# Patient Record
Sex: FEMALE | Race: WHITE | NOT HISPANIC OR LATINO | URBAN - METROPOLITAN AREA
[De-identification: names, ages, dates, MRNs, and addresses within clinical notes are randomized per-mention and may not be internally consistent; named-entity substitution may affect disease eponyms.]

---

## 2017-11-15 ENCOUNTER — OUTPATIENT (OUTPATIENT)
Dept: OUTPATIENT SERVICES | Facility: HOSPITAL | Age: 27
LOS: 1 days | End: 2017-11-15
Payer: MEDICAID

## 2017-11-15 ENCOUNTER — APPOINTMENT (OUTPATIENT)
Dept: OBGYN | Facility: CLINIC | Age: 27
End: 2017-11-15
Payer: MEDICAID

## 2017-11-15 ENCOUNTER — NON-APPOINTMENT (OUTPATIENT)
Age: 27
End: 2017-11-15

## 2017-11-15 VITALS
BODY MASS INDEX: 37.82 KG/M2 | SYSTOLIC BLOOD PRESSURE: 109 MMHG | DIASTOLIC BLOOD PRESSURE: 62 MMHG | HEIGHT: 65 IN | WEIGHT: 227 LBS

## 2017-11-15 DIAGNOSIS — Z34.01 ENCOUNTER FOR SUPERVISION OF NORMAL FIRST PREGNANCY, FIRST TRIMESTER: ICD-10-CM

## 2017-11-15 DIAGNOSIS — Z34.00 ENCOUNTER FOR SUPERVISION OF NORMAL FIRST PREGNANCY, UNSPECIFIED TRIMESTER: ICD-10-CM

## 2017-11-15 DIAGNOSIS — Z3A.11 11 WEEKS GESTATION OF PREGNANCY: ICD-10-CM

## 2017-11-15 DIAGNOSIS — Z87.898 PERSONAL HISTORY OF OTHER SPECIFIED CONDITIONS: ICD-10-CM

## 2017-11-15 DIAGNOSIS — Z11.1 ENCOUNTER FOR SCREENING FOR RESPIRATORY TUBERCULOSIS: ICD-10-CM

## 2017-11-15 DIAGNOSIS — N88.3 INCOMPETENCE OF CERVIX UTERI: ICD-10-CM

## 2017-11-15 DIAGNOSIS — Z82.49 FAMILY HISTORY OF ISCHEMIC HEART DISEASE AND OTHER DISEASES OF THE CIRCULATORY SYSTEM: ICD-10-CM

## 2017-11-15 DIAGNOSIS — Z87.891 PERSONAL HISTORY OF NICOTINE DEPENDENCE: ICD-10-CM

## 2017-11-15 DIAGNOSIS — Z86.19 PERSONAL HISTORY OF OTHER INFECTIOUS AND PARASITIC DISEASES: ICD-10-CM

## 2017-11-15 DIAGNOSIS — E66.9 OBESITY, UNSPECIFIED: ICD-10-CM

## 2017-11-15 LAB
APPEARANCE UR: CLEAR — SIGNIFICANT CHANGE UP
BILIRUB UR-MCNC: NEGATIVE — SIGNIFICANT CHANGE UP
COLOR SPEC: YELLOW — SIGNIFICANT CHANGE UP
DIFF PNL FLD: NEGATIVE — SIGNIFICANT CHANGE UP
GLUCOSE UR QL: NEGATIVE MG/DL — SIGNIFICANT CHANGE UP
KETONES UR-MCNC: NEGATIVE — SIGNIFICANT CHANGE UP
LEUKOCYTE ESTERASE UR-ACNC: NEGATIVE — SIGNIFICANT CHANGE UP
NITRITE UR-MCNC: NEGATIVE — SIGNIFICANT CHANGE UP
PH UR: 6.5 — SIGNIFICANT CHANGE UP (ref 5–8)
PROT UR-MCNC: NEGATIVE MG/DL — SIGNIFICANT CHANGE UP
SP GR SPEC: 1.01 — SIGNIFICANT CHANGE UP (ref 1.01–1.02)
UROBILINOGEN FLD QL: NEGATIVE MG/DL — SIGNIFICANT CHANGE UP

## 2017-11-15 PROCEDURE — 81003 URINALYSIS AUTO W/O SCOPE: CPT

## 2017-11-15 PROCEDURE — 86762 RUBELLA ANTIBODY: CPT

## 2017-11-15 PROCEDURE — 81099 UNLISTED URINALYSIS PX: CPT | Mod: NC

## 2017-11-15 PROCEDURE — 81025 URINE PREGNANCY TEST: CPT

## 2017-11-15 PROCEDURE — 86900 BLOOD TYPING SEROLOGIC ABO: CPT

## 2017-11-15 PROCEDURE — 83020 HEMOGLOBIN ELECTROPHORESIS: CPT

## 2017-11-15 PROCEDURE — 86850 RBC ANTIBODY SCREEN: CPT

## 2017-11-15 PROCEDURE — 86901 BLOOD TYPING SEROLOGIC RH(D): CPT

## 2017-11-15 PROCEDURE — 81025 URINE PREGNANCY TEST: CPT | Mod: NC

## 2017-11-15 PROCEDURE — 99203 OFFICE O/P NEW LOW 30 MIN: CPT | Mod: NC

## 2017-11-15 PROCEDURE — 87389 HIV-1 AG W/HIV-1&-2 AB AG IA: CPT

## 2017-11-15 PROCEDURE — 86787 VARICELLA-ZOSTER ANTIBODY: CPT

## 2017-11-15 PROCEDURE — 36415 COLL VENOUS BLD VENIPUNCTURE: CPT | Mod: NC

## 2017-11-15 PROCEDURE — G0463: CPT

## 2017-11-15 PROCEDURE — 86780 TREPONEMA PALLIDUM: CPT

## 2017-11-15 PROCEDURE — 83020 HEMOGLOBIN ELECTROPHORESIS: CPT | Mod: 26

## 2017-11-15 PROCEDURE — 83655 ASSAY OF LEAD: CPT

## 2017-11-15 PROCEDURE — 36415 COLL VENOUS BLD VENIPUNCTURE: CPT

## 2017-11-15 PROCEDURE — 81220 CFTR GENE COM VARIANTS: CPT

## 2017-11-15 PROCEDURE — 87086 URINE CULTURE/COLONY COUNT: CPT

## 2017-11-15 PROCEDURE — 86480 TB TEST CELL IMMUN MEASURE: CPT

## 2017-11-15 RX ORDER — PRENATAL VIT NO.130/IRON/FOLIC 27MG-0.8MG
TABLET ORAL
Refills: 0 | Status: ACTIVE | COMMUNITY

## 2017-11-16 LAB
CULTURE RESULTS: SIGNIFICANT CHANGE UP
HIV 1+2 AB+HIV1 P24 AG SERPL QL IA: SIGNIFICANT CHANGE UP
LEAD BLD-MCNC: SIGNIFICANT CHANGE UP UG/DL (ref 0–4)
RUBV IGG SER-ACNC: 18 INDEX — SIGNIFICANT CHANGE UP
RUBV IGG SER-IMP: POSITIVE — SIGNIFICANT CHANGE UP
SPECIMEN SOURCE: SIGNIFICANT CHANGE UP
T PALLIDUM AB TITR SER: NEGATIVE — SIGNIFICANT CHANGE UP
VZV IGG FLD QL IA: 836.2 INDEX — SIGNIFICANT CHANGE UP
VZV IGG FLD QL IA: POSITIVE — SIGNIFICANT CHANGE UP

## 2017-11-17 LAB
HEMOGLOBIN INTERPRETATION: SIGNIFICANT CHANGE UP
HGB A MFR BLD: 97.3 % — SIGNIFICANT CHANGE UP (ref 95.8–98)
HGB A2 MFR BLD: 2.7 % — SIGNIFICANT CHANGE UP (ref 2–3.2)
M TB TUBERC IFN-G BLD QL: 0 IU/ML — SIGNIFICANT CHANGE UP
M TB TUBERC IFN-G BLD QL: 0.02 IU/ML — SIGNIFICANT CHANGE UP
M TB TUBERC IFN-G BLD QL: NEGATIVE — SIGNIFICANT CHANGE UP
MITOGEN IGNF BCKGRD COR BLD-ACNC: 4.31 IU/ML — SIGNIFICANT CHANGE UP

## 2017-11-22 DIAGNOSIS — Z34.01 ENCOUNTER FOR SUPERVISION OF NORMAL FIRST PREGNANCY, FIRST TRIMESTER: ICD-10-CM

## 2017-11-22 DIAGNOSIS — E66.9 OBESITY, UNSPECIFIED: ICD-10-CM

## 2017-11-22 DIAGNOSIS — Z3A.11 11 WEEKS GESTATION OF PREGNANCY: ICD-10-CM

## 2017-11-22 LAB — CYSTIC FIBROSIS EXPANDED PANEL: SIGNIFICANT CHANGE UP

## 2017-11-30 ENCOUNTER — APPOINTMENT (OUTPATIENT)
Dept: ANTEPARTUM | Facility: CLINIC | Age: 27
End: 2017-11-30
Payer: MEDICAID

## 2017-11-30 ENCOUNTER — ASOB RESULT (OUTPATIENT)
Age: 27
End: 2017-11-30

## 2017-11-30 PROCEDURE — 76801 OB US < 14 WKS SINGLE FETUS: CPT

## 2017-11-30 PROCEDURE — 99201 OFFICE OUTPATIENT NEW 10 MINUTES: CPT | Mod: 25

## 2017-11-30 PROCEDURE — 36416 COLLJ CAPILLARY BLOOD SPEC: CPT

## 2017-11-30 PROCEDURE — 76813 OB US NUCHAL MEAS 1 GEST: CPT

## 2017-12-13 ENCOUNTER — APPOINTMENT (OUTPATIENT)
Dept: OBGYN | Facility: CLINIC | Age: 27
End: 2017-12-13

## 2018-01-25 ENCOUNTER — APPOINTMENT (OUTPATIENT)
Dept: ANTEPARTUM | Facility: CLINIC | Age: 28
End: 2018-01-25

## 2020-04-29 ENCOUNTER — APPOINTMENT (OUTPATIENT)
Dept: PEDIATRIC CARDIOLOGY | Facility: CLINIC | Age: 30
End: 2020-04-29
Payer: MEDICAID

## 2020-04-29 PROCEDURE — 76821 MIDDLE CEREBRAL ARTERY ECHO: CPT

## 2020-04-29 PROCEDURE — 99201 OFFICE OUTPATIENT NEW 10 MINUTES: CPT | Mod: 25

## 2020-04-29 PROCEDURE — 76820 UMBILICAL ARTERY ECHO: CPT

## 2020-04-29 PROCEDURE — 93325 DOPPLER ECHO COLOR FLOW MAPG: CPT | Mod: 59

## 2020-04-29 PROCEDURE — 76825 ECHO EXAM OF FETAL HEART: CPT

## 2020-04-29 PROCEDURE — 76827 ECHO EXAM OF FETAL HEART: CPT

## 2020-07-06 ENCOUNTER — ASOB RESULT (OUTPATIENT)
Age: 30
End: 2020-07-06

## 2020-07-06 ENCOUNTER — APPOINTMENT (OUTPATIENT)
Dept: MATERNAL FETAL MEDICINE | Facility: CLINIC | Age: 30
End: 2020-07-06
Payer: MEDICAID

## 2020-07-06 PROCEDURE — G0108 DIAB MANAGE TRN  PER INDIV: CPT | Mod: 95

## 2020-07-23 ENCOUNTER — TRANSCRIPTION ENCOUNTER (OUTPATIENT)
Age: 30
End: 2020-07-23

## 2020-07-27 ENCOUNTER — ASOB RESULT (OUTPATIENT)
Age: 30
End: 2020-07-27

## 2020-07-27 ENCOUNTER — APPOINTMENT (OUTPATIENT)
Dept: MATERNAL FETAL MEDICINE | Facility: CLINIC | Age: 30
End: 2020-07-27
Payer: MEDICAID

## 2020-07-27 PROCEDURE — G0108 DIAB MANAGE TRN  PER INDIV: CPT | Mod: 95

## 2020-08-11 DIAGNOSIS — Z01.818 ENCOUNTER FOR OTHER PREPROCEDURAL EXAMINATION: ICD-10-CM

## 2020-08-13 ENCOUNTER — APPOINTMENT (OUTPATIENT)
Dept: DISASTER EMERGENCY | Facility: CLINIC | Age: 30
End: 2020-08-13

## 2020-08-13 LAB — SARS-COV-2 N GENE NPH QL NAA+PROBE: NOT DETECTED

## 2020-08-16 ENCOUNTER — INPATIENT (INPATIENT)
Facility: HOSPITAL | Age: 30
LOS: 1 days | Discharge: HOME | End: 2020-08-18
Attending: OBSTETRICS & GYNECOLOGY | Admitting: OBSTETRICS & GYNECOLOGY

## 2020-08-16 ENCOUNTER — TRANSCRIPTION ENCOUNTER (OUTPATIENT)
Age: 30
End: 2020-08-16

## 2020-08-16 VITALS — SYSTOLIC BLOOD PRESSURE: 131 MMHG | HEART RATE: 88 BPM | DIASTOLIC BLOOD PRESSURE: 60 MMHG

## 2020-08-16 DIAGNOSIS — O24.415 GESTATIONAL DIABETES MELLITUS IN PREGNANCY, CONTROLLED BY ORAL HYPOGLYCEMIC DRUGS: ICD-10-CM

## 2020-08-16 DIAGNOSIS — O24.419 GESTATIONAL DIABETES MELLITUS IN PREGNANCY, UNSPECIFIED CONTROL: ICD-10-CM

## 2020-08-16 LAB
BASOPHILS # BLD AUTO: 0.03 K/UL — SIGNIFICANT CHANGE UP (ref 0–0.2)
BASOPHILS NFR BLD AUTO: 0.2 % — SIGNIFICANT CHANGE UP (ref 0–2)
BLD GP AB SCN SERPL QL: NEGATIVE — SIGNIFICANT CHANGE UP
EOSINOPHIL # BLD AUTO: 0.04 K/UL — SIGNIFICANT CHANGE UP (ref 0–0.5)
EOSINOPHIL NFR BLD AUTO: 0.3 % — SIGNIFICANT CHANGE UP (ref 0–6)
GLUCOSE BLDC GLUCOMTR-MCNC: 86 MG/DL — SIGNIFICANT CHANGE UP (ref 70–99)
GLUCOSE BLDC GLUCOMTR-MCNC: 95 MG/DL — SIGNIFICANT CHANGE UP (ref 70–99)
GLUCOSE BLDC GLUCOMTR-MCNC: 96 MG/DL — SIGNIFICANT CHANGE UP (ref 70–99)
HCT VFR BLD CALC: 36 % — SIGNIFICANT CHANGE UP (ref 34.5–45)
HGB BLD-MCNC: 12 G/DL — SIGNIFICANT CHANGE UP (ref 11.5–15.5)
IMM GRANULOCYTES NFR BLD AUTO: 0.5 % — SIGNIFICANT CHANGE UP (ref 0–1.5)
LYMPHOCYTES # BLD AUTO: 1.83 K/UL — SIGNIFICANT CHANGE UP (ref 1–3.3)
LYMPHOCYTES # BLD AUTO: 14.5 % — SIGNIFICANT CHANGE UP (ref 13–44)
MCHC RBC-ENTMCNC: 30.2 PG — SIGNIFICANT CHANGE UP (ref 27–34)
MCHC RBC-ENTMCNC: 33.3 % — SIGNIFICANT CHANGE UP (ref 32–36)
MCV RBC AUTO: 90.7 FL — SIGNIFICANT CHANGE UP (ref 80–100)
MONOCYTES # BLD AUTO: 0.58 K/UL — SIGNIFICANT CHANGE UP (ref 0–0.9)
MONOCYTES NFR BLD AUTO: 4.6 % — SIGNIFICANT CHANGE UP (ref 2–14)
NEUTROPHILS # BLD AUTO: 10.05 K/UL — HIGH (ref 1.8–7.4)
NEUTROPHILS NFR BLD AUTO: 79.9 % — HIGH (ref 43–77)
NRBC # FLD: 0 K/UL — SIGNIFICANT CHANGE UP (ref 0–0)
PLATELET # BLD AUTO: 279 K/UL — SIGNIFICANT CHANGE UP (ref 150–400)
PMV BLD: 9.9 FL — SIGNIFICANT CHANGE UP (ref 7–13)
RBC # BLD: 3.97 M/UL — SIGNIFICANT CHANGE UP (ref 3.8–5.2)
RBC # FLD: 13.4 % — SIGNIFICANT CHANGE UP (ref 10.3–14.5)
RH IG SCN BLD-IMP: POSITIVE — SIGNIFICANT CHANGE UP
RH IG SCN BLD-IMP: POSITIVE — SIGNIFICANT CHANGE UP
WBC # BLD: 12.59 K/UL — HIGH (ref 3.8–10.5)
WBC # FLD AUTO: 12.59 K/UL — HIGH (ref 3.8–10.5)

## 2020-08-16 RX ORDER — ACETAMINOPHEN 500 MG
975 TABLET ORAL ONCE
Refills: 0 | Status: COMPLETED | OUTPATIENT
Start: 2020-08-16 | End: 2020-08-16

## 2020-08-16 RX ORDER — SODIUM CHLORIDE 9 MG/ML
1000 INJECTION INTRAMUSCULAR; INTRAVENOUS; SUBCUTANEOUS
Refills: 0 | Status: DISCONTINUED | OUTPATIENT
Start: 2020-08-16 | End: 2020-08-17

## 2020-08-16 RX ORDER — SODIUM CHLORIDE 9 MG/ML
1000 INJECTION, SOLUTION INTRAVENOUS
Refills: 0 | Status: DISCONTINUED | OUTPATIENT
Start: 2020-08-16 | End: 2020-08-17

## 2020-08-16 RX ORDER — OXYTOCIN 10 UNIT/ML
333.33 VIAL (ML) INJECTION
Qty: 20 | Refills: 0 | Status: COMPLETED | OUTPATIENT
Start: 2020-08-16 | End: 2020-08-16

## 2020-08-16 RX ORDER — OXYTOCIN 10 UNIT/ML
2 VIAL (ML) INJECTION
Qty: 30 | Refills: 0 | Status: DISCONTINUED | OUTPATIENT
Start: 2020-08-16 | End: 2020-08-17

## 2020-08-16 RX ADMIN — Medication 975 MILLIGRAM(S): at 17:38

## 2020-08-16 RX ADMIN — Medication 975 MILLIGRAM(S): at 16:30

## 2020-08-16 NOTE — OB PROVIDER H&P - PROBLEM SELECTOR PLAN 1
-Admit to L&D  -Routine labs  -Alt IVF  -BGM q2h active labor  -BGM q4h latent labor  -Anesthesia consult  -Vaginal Cytotec  Cervical balloon when possible  -D/W Dr. Dow

## 2020-08-16 NOTE — CHART NOTE - NSCHARTNOTEFT_GEN_A_CORE
R1 Labor Note    S: Patient evaluated at bedside for cervical change.     O:  T(C): 36.9 (20 @ 19:00), Max: 36.9 (20 @ 19:00)  HR: 65 (20 @ 20:44) (65 - 88)  BP: 132/62 (20 @ 20:44) (131/60 - 132/62)  RR: 18 (20 @ 15:20) (18 - 18)  SpO2: --    SVE: 3.5/60/-3  EFM: Category 1  [place]: q 4 minutes         A/P 29y P1 @ 40 wks IOL  -IOL:Vag cyto placed, possible pitocin at next cervical check  -Cat 1 tracing  -Anticipate     d/w Dr. Jose Cardoso PGY-1

## 2020-08-16 NOTE — OB PROVIDER H&P - HISTORY OF PRESENT ILLNESS
28 y/o  EDC 20 @40w presents for scheduled IOL for A2. Denies abd pain, LOF, VB. Endorses +FM. GBS negative. COVID negative.     AP course complicated by:   -GDMA2 on metformin 500 qd, fastings 70s-75s, postprandials 110-120s  OBhx:   -2018 /6#15/female/uncomplicated  GYNhx: Denies fibroids, cysts, abnormal pap smears, STDs  PMHx:   -Obesity prepregnancy 230lbs current 249.5 BMI 40.3  PSHx: Denies  Social: Denies x3  Psych: Denies    NKDA  Meds: Metformin 500mg qd, PNV

## 2020-08-16 NOTE — CHART NOTE - NSCHARTNOTEFT_GEN_A_CORE
NP note    Pt seen for placement of vaginal cytotec #1      T(C): 36.8 (16 Aug 2020 15:20), Max: 36.8 (16 Aug 2020 15:20)  T(F): 98.2 (16 Aug 2020 15:20), Max: 98.2 (16 Aug 2020 15:20)  HR: 88 (16 Aug 2020 15:20) (88 - 88)  BP: 131/60 (16 Aug 2020 15:20) (131/60 - 131/60)  BGM 95  /moderate variability/+ Accels/no decels  [place] irregualr  SVE 0.5/50/-3    Vaginal cytotec#1 placed without incident. Pt tolerated well.     -Re-evaluate in 4 hours  -Cervical balloon when possible    sredze, NP

## 2020-08-16 NOTE — OB PROVIDER H&P - NS_OBGYNHISTORY_OBGYN_ALL_OB_FT
AP course complicated by:   -GDMA2 on metformin 500 qd, fastings 70s-75s, postprandials 110-120s  OBhx:   -2018 /6#15/female/uncomplicated  GYNhx: Denies fibroids, cysts, abnormal pap smears, STDs

## 2020-08-16 NOTE — OB PROVIDER H&P - ASSESSMENT
GEN: NAD  Lungs: CTA-B  Heart: R/R/R  Abd soft, NT, gravid  HR: 88 (16 Aug 2020 15:03) (88 - 88)  BP: 131/60 (16 Aug 2020 15:03) (131/60 - 131/60)  Awaiting BGM  /moderate variability/+ Accels/no decels  [place] irregular  Sono vtx confirmed, EFW~8#3 as of   SVE 0.5/50/-3    A/P: 28 y/o  EDC 20 @40w IOL for A2    -Admit to L&D  -Routine labs  -Alt IVF  -BGM q2h active labor  -BGM q4h latent labor  -Anesthesia consult  -Vaginal Cytotec  Cervical balloon when possible  -D/W Dr. Paloma biswas, NP

## 2020-08-17 LAB — T PALLIDUM AB TITR SER: NEGATIVE — SIGNIFICANT CHANGE UP

## 2020-08-17 RX ORDER — LANOLIN
1 OINTMENT (GRAM) TOPICAL EVERY 6 HOURS
Refills: 0 | Status: DISCONTINUED | OUTPATIENT
Start: 2020-08-17 | End: 2020-08-18

## 2020-08-17 RX ORDER — IBUPROFEN 200 MG
600 TABLET ORAL EVERY 6 HOURS
Refills: 0 | Status: DISCONTINUED | OUTPATIENT
Start: 2020-08-17 | End: 2020-08-18

## 2020-08-17 RX ORDER — OXYTOCIN 10 UNIT/ML
2 VIAL (ML) INJECTION
Qty: 30 | Refills: 0 | Status: DISCONTINUED | OUTPATIENT
Start: 2020-08-17 | End: 2020-08-17

## 2020-08-17 RX ORDER — SODIUM CHLORIDE 9 MG/ML
1000 INJECTION INTRAMUSCULAR; INTRAVENOUS; SUBCUTANEOUS
Refills: 0 | Status: DISCONTINUED | OUTPATIENT
Start: 2020-08-17 | End: 2020-08-17

## 2020-08-17 RX ORDER — OXYTOCIN 10 UNIT/ML
333.33 VIAL (ML) INJECTION
Qty: 20 | Refills: 0 | Status: DISCONTINUED | OUTPATIENT
Start: 2020-08-17 | End: 2020-08-17

## 2020-08-17 RX ORDER — ONDANSETRON 8 MG/1
4 TABLET, FILM COATED ORAL ONCE
Refills: 0 | Status: COMPLETED | OUTPATIENT
Start: 2020-08-17 | End: 2020-08-17

## 2020-08-17 RX ORDER — AER TRAVELER 0.5 G/1
1 SOLUTION RECTAL; TOPICAL EVERY 4 HOURS
Refills: 0 | Status: DISCONTINUED | OUTPATIENT
Start: 2020-08-17 | End: 2020-08-18

## 2020-08-17 RX ORDER — PRAMOXINE HYDROCHLORIDE 150 MG/15G
1 AEROSOL, FOAM RECTAL EVERY 4 HOURS
Refills: 0 | Status: DISCONTINUED | OUTPATIENT
Start: 2020-08-17 | End: 2020-08-18

## 2020-08-17 RX ORDER — DIPHENHYDRAMINE HCL 50 MG
25 CAPSULE ORAL EVERY 6 HOURS
Refills: 0 | Status: DISCONTINUED | OUTPATIENT
Start: 2020-08-17 | End: 2020-08-18

## 2020-08-17 RX ORDER — OXYCODONE HYDROCHLORIDE 5 MG/1
5 TABLET ORAL ONCE
Refills: 0 | Status: DISCONTINUED | OUTPATIENT
Start: 2020-08-17 | End: 2020-08-18

## 2020-08-17 RX ORDER — HYDROCORTISONE 1 %
1 OINTMENT (GRAM) TOPICAL EVERY 6 HOURS
Refills: 0 | Status: DISCONTINUED | OUTPATIENT
Start: 2020-08-17 | End: 2020-08-18

## 2020-08-17 RX ORDER — ACETAMINOPHEN 500 MG
3 TABLET ORAL
Qty: 0 | Refills: 0 | DISCHARGE
Start: 2020-08-17

## 2020-08-17 RX ORDER — SIMETHICONE 80 MG/1
80 TABLET, CHEWABLE ORAL EVERY 4 HOURS
Refills: 0 | Status: DISCONTINUED | OUTPATIENT
Start: 2020-08-17 | End: 2020-08-18

## 2020-08-17 RX ORDER — IBUPROFEN 200 MG
600 TABLET ORAL EVERY 6 HOURS
Refills: 0 | Status: COMPLETED | OUTPATIENT
Start: 2020-08-17 | End: 2021-07-16

## 2020-08-17 RX ORDER — TETANUS TOXOID, REDUCED DIPHTHERIA TOXOID AND ACELLULAR PERTUSSIS VACCINE, ADSORBED 5; 2.5; 8; 8; 2.5 [IU]/.5ML; [IU]/.5ML; UG/.5ML; UG/.5ML; UG/.5ML
0.5 SUSPENSION INTRAMUSCULAR ONCE
Refills: 0 | Status: DISCONTINUED | OUTPATIENT
Start: 2020-08-17 | End: 2020-08-18

## 2020-08-17 RX ORDER — MAGNESIUM HYDROXIDE 400 MG/1
30 TABLET, CHEWABLE ORAL
Refills: 0 | Status: DISCONTINUED | OUTPATIENT
Start: 2020-08-17 | End: 2020-08-18

## 2020-08-17 RX ORDER — DIBUCAINE 1 %
1 OINTMENT (GRAM) RECTAL EVERY 6 HOURS
Refills: 0 | Status: DISCONTINUED | OUTPATIENT
Start: 2020-08-17 | End: 2020-08-18

## 2020-08-17 RX ORDER — OXYCODONE HYDROCHLORIDE 5 MG/1
5 TABLET ORAL
Refills: 0 | Status: DISCONTINUED | OUTPATIENT
Start: 2020-08-17 | End: 2020-08-18

## 2020-08-17 RX ORDER — ACETAMINOPHEN 500 MG
975 TABLET ORAL
Refills: 0 | Status: DISCONTINUED | OUTPATIENT
Start: 2020-08-17 | End: 2020-08-18

## 2020-08-17 RX ORDER — KETOROLAC TROMETHAMINE 30 MG/ML
30 SYRINGE (ML) INJECTION ONCE
Refills: 0 | Status: DISCONTINUED | OUTPATIENT
Start: 2020-08-17 | End: 2020-08-17

## 2020-08-17 RX ORDER — SODIUM CHLORIDE 9 MG/ML
500 INJECTION INTRAMUSCULAR; INTRAVENOUS; SUBCUTANEOUS ONCE
Refills: 0 | Status: DISCONTINUED | OUTPATIENT
Start: 2020-08-17 | End: 2020-08-17

## 2020-08-17 RX ORDER — BENZOCAINE 10 %
1 GEL (GRAM) MUCOUS MEMBRANE EVERY 6 HOURS
Refills: 0 | Status: DISCONTINUED | OUTPATIENT
Start: 2020-08-17 | End: 2020-08-18

## 2020-08-17 RX ORDER — SODIUM CHLORIDE 9 MG/ML
3 INJECTION INTRAMUSCULAR; INTRAVENOUS; SUBCUTANEOUS EVERY 8 HOURS
Refills: 0 | Status: DISCONTINUED | OUTPATIENT
Start: 2020-08-17 | End: 2020-08-18

## 2020-08-17 RX ADMIN — Medication 975 MILLIGRAM(S): at 16:50

## 2020-08-17 RX ADMIN — Medication 975 MILLIGRAM(S): at 09:04

## 2020-08-17 RX ADMIN — Medication 1000 MILLIUNIT(S)/MIN: at 02:54

## 2020-08-17 RX ADMIN — Medication 600 MILLIGRAM(S): at 13:00

## 2020-08-17 RX ADMIN — Medication 25 MILLIGRAM(S): at 03:28

## 2020-08-17 RX ADMIN — ONDANSETRON 4 MILLIGRAM(S): 8 TABLET, FILM COATED ORAL at 00:20

## 2020-08-17 RX ADMIN — SODIUM CHLORIDE 3 MILLILITER(S): 9 INJECTION INTRAMUSCULAR; INTRAVENOUS; SUBCUTANEOUS at 15:45

## 2020-08-17 RX ADMIN — Medication 975 MILLIGRAM(S): at 20:38

## 2020-08-17 RX ADMIN — Medication 30 MILLIGRAM(S): at 03:04

## 2020-08-17 RX ADMIN — Medication 975 MILLIGRAM(S): at 15:52

## 2020-08-17 RX ADMIN — Medication 600 MILLIGRAM(S): at 19:29

## 2020-08-17 RX ADMIN — Medication 600 MILLIGRAM(S): at 11:57

## 2020-08-17 RX ADMIN — SODIUM CHLORIDE 3 MILLILITER(S): 9 INJECTION INTRAMUSCULAR; INTRAVENOUS; SUBCUTANEOUS at 20:40

## 2020-08-17 RX ADMIN — Medication 975 MILLIGRAM(S): at 21:15

## 2020-08-17 RX ADMIN — Medication 600 MILLIGRAM(S): at 23:51

## 2020-08-17 RX ADMIN — OXYCODONE HYDROCHLORIDE 5 MILLIGRAM(S): 5 TABLET ORAL at 21:15

## 2020-08-17 RX ADMIN — Medication 30 MILLIGRAM(S): at 03:23

## 2020-08-17 RX ADMIN — OXYCODONE HYDROCHLORIDE 5 MILLIGRAM(S): 5 TABLET ORAL at 20:38

## 2020-08-17 RX ADMIN — Medication 975 MILLIGRAM(S): at 08:13

## 2020-08-17 RX ADMIN — Medication 600 MILLIGRAM(S): at 18:48

## 2020-08-17 NOTE — PROGRESS NOTE ADULT - SUBJECTIVE AND OBJECTIVE BOX
Post-partum Note,   She is a  29y woman who is now post-partum day: 0    Subjective:  The patient feels well.  She is ambulating.   She is tolerating regular diet.  She denies nausea and vomiting; denies fever.  She is voiding.  Her pain is controlled.  She reports normal postpartum bleeding.  She is breastfeeding and formula feeding.    Physical exam:    Vital Signs Last 24 Hrs  T(C): 36.7 (17 Aug 2020 10:07), Max: 36.9 (16 Aug 2020 19:00)  T(F): 98 (17 Aug 2020 10:07), Max: 98.42 (16 Aug 2020 23:59)  HR: 61 (17 Aug 2020 10:07) (50 - 156)  BP: 110/65 (17 Aug 2020 10:07) (80/43 - 132/62)  BP(mean): --  RR: 18 (17 Aug 2020 10:07) (18 - 18)  SpO2: 98% (17 Aug 2020 10:07) (72% - 100%)    Gen: NAD  Breast: Soft, nontender, not engorged.  Abdomen: Soft, nontender, no distension , firm uterine fundus at umbilicus.  Pelvic: Normal lochia noted  Ext: No calf tenderness    LABS:                        12.0   12.59 )-----------( 279      ( 16 Aug 2020 15:00 )             36.0     ABO Interpretation: O (08-16 @ 15:08)  Rh Interpretation: Positive (08-16 @ 15:08)  Antibody Screen: Negative ( @ 15:00)  ABO Interpretation: O (0816 @ 15:00)  Rh Interpretation: Positive (16 @ 15:00)    Allergies    No Known Allergies      MEDICATIONS  (STANDING):  acetaminophen   Tablet .. 975 milliGRAM(s) Oral <User Schedule>  diphtheria/tetanus/pertussis (acellular) Vaccine (ADAcel) 0.5 milliLiter(s) IntraMuscular once  ibuprofen  Tablet. 600 milliGRAM(s) Oral every 6 hours  prenatal multivitamin 1 Tablet(s) Oral daily  sodium chloride 0.9% lock flush 3 milliLiter(s) IV Push every 8 hours    MEDICATIONS  (PRN):  benzocaine 20%/menthol 0.5% Spray 1 Spray(s) Topical every 6 hours PRN for Perineal discomfort  dibucaine 1% Ointment 1 Application(s) Topical every 6 hours PRN Perineal discomfort  diphenhydrAMINE 25 milliGRAM(s) Oral every 6 hours PRN Pruritus  hydrocortisone 1% Cream 1 Application(s) Topical every 6 hours PRN Moderate Pain (4-6)  lanolin Ointment 1 Application(s) Topical every 6 hours PRN nipple soreness  magnesium hydroxide Suspension 30 milliLiter(s) Oral two times a day PRN Constipation  oxyCODONE    IR 5 milliGRAM(s) Oral every 3 hours PRN Moderate to Severe Pain (4-10)  oxyCODONE    IR 5 milliGRAM(s) Oral once PRN Moderate to Severe Pain (4-10)  pramoxine 1%/zinc 5% Cream 1 Application(s) Topical every 4 hours PRN Moderate Pain (4-6)  simethicone 80 milliGRAM(s) Chew every 4 hours PRN Gas  witch hazel Pads 1 Application(s) Topical every 4 hours PRN Perineal discomfort

## 2020-08-17 NOTE — CHART NOTE - NSCHARTNOTEFT_GEN_A_CORE
ISE placed shortly after SVE at 12:26 am on 8/17/2020 as the tracing was discontinuous on multiple occasions.       Stephanie Cardoso, PGY-1

## 2020-08-17 NOTE — OB RN DELIVERY SUMMARY - NS_LABORCHARACTER_OBGYN_ALL_OB
Internal electronic FM/Induction of labor-Medicinal/Meconium staining/Induction of labor-AROM/External electronic FM

## 2020-08-17 NOTE — DISCHARGE NOTE OB - CARE PROVIDER_API CALL
Leah Garcia  OBS-GYN - GENERAL  [id number] [place] [address]  Phone: [phone]  Fax: [phone]  Follow Up Time:

## 2020-08-17 NOTE — DISCHARGE NOTE OB - PATIENT PORTAL LINK FT
You can access the FollowMyHealth Patient Portal offered by [facility] by registering at the following website: [email or web address]. By joining [employer]’s FollowMyHealth portal, you will also be able to view your health information using other applications (apps) compatible with our system.

## 2020-08-17 NOTE — OB RN DELIVERY SUMMARY - NS_SEPSISRSKCALC_OBGYN_ALL_OB_FT
EOS calculated successfully. EOS Risk Factor: 0.5/1000 live births ([facility] national incidence); GA=40w1d; Temp=98.42; ROM=2.133; GBS='Negative'; Antibiotics='No antibiotics or any antibiotics < 2 hrs prior to birth'

## 2020-08-17 NOTE — CHART NOTE - NSCHARTNOTEFT_GEN_A_CORE
R1 Labor Note    S: Patient evaluated at bedside for cervical change.     O:  T(C): 36.8 (20 @ 23:15), Max: 36.9 (20 @ 19:00)  HR: 75 (20 @ 00:15) (51 - 88)  BP: 80/43 (20 @ 00:15) (80/43 - 132/62)  RR: 18 (20 @ 15:20) (18 - 18)  SpO2: 100% (20 @ 00:12) (83% - 100%)    SVE: 4.5/70/-2  EFM: Category 1   [place]:  q 4 minutes      A/P 29y P1 @ 40wks IOL  -IOL: Start on Pitocin   -AROM at midnight  -Cat 1 tracing  -Analgesia: Epidural in place   -Zofran for nausea  -Anticipate     d/w Dr. Jose Cardoso PGY-1

## 2020-08-17 NOTE — OB PROVIDER DELIVERY SUMMARY - NSPROVIDERDELIVERYNOTE_OBGYN_ALL_OB_FT
Spontaneous vaginal delivery of liveborn infant from OA position. Head, shoulders, and body delivered easily. Infant was suctioned. Light mec. Delayed cord clamping and infant was passed to mother. Cord clamped and cut. Placenta delivered intact with a 3 vessel cord. Fundal massage was given and uterine fundus was found to be firm. Vaginal exam revealed an intact cervix, vaginal walls and sulci. Patient had no lacerations in the perineum. Excellent hemostasis was noted. Patient was stable and went to recovery. Count was correct x 2.     Stephanie Cardoso, PGY-1

## 2020-08-17 NOTE — PROGRESS NOTE ADULT - ASSESSMENT
Assessment and Plan  PPD #0 s/p   Doing well and bonding with baby  Encourage ambulation.  PP & PPD Instructions reviewed.  CPC.  D/C home tomorrow.

## 2020-08-17 NOTE — DISCHARGE NOTE OB - MEDICATION SUMMARY - MEDICATIONS TO TAKE
I will START or STAY ON the medications listed below when I get home from the hospital:  None I will START or STAY ON the medications listed below when I get home from the hospital:    acetaminophen 325 mg oral tablet  -- 3 tab(s) by mouth every 6 hours, As Needed  -- Indication: For pain

## 2020-08-18 VITALS
TEMPERATURE: 98 F | OXYGEN SATURATION: 96 % | HEART RATE: 58 BPM | DIASTOLIC BLOOD PRESSURE: 71 MMHG | RESPIRATION RATE: 17 BRPM | SYSTOLIC BLOOD PRESSURE: 123 MMHG

## 2020-08-18 RX ADMIN — Medication 600 MILLIGRAM(S): at 06:30

## 2020-08-18 RX ADMIN — Medication 975 MILLIGRAM(S): at 04:15

## 2020-08-18 RX ADMIN — Medication 600 MILLIGRAM(S): at 00:44

## 2020-08-18 RX ADMIN — SODIUM CHLORIDE 3 MILLILITER(S): 9 INJECTION INTRAMUSCULAR; INTRAVENOUS; SUBCUTANEOUS at 06:30

## 2020-08-18 RX ADMIN — Medication 975 MILLIGRAM(S): at 03:47

## 2020-08-18 NOTE — PROGRESS NOTE ADULT - SUBJECTIVE AND OBJECTIVE BOX
Patient reports feeling well. She reports minimal pain and minimal vaginal bleeding.    Physical exam:    Vital Signs Last 24 Hrs  T(C): 36.8 (18 Aug 2020 06:08), Max: 36.8 (17 Aug 2020 18:20)  T(F): 98.2 (18 Aug 2020 06:08), Max: 98.2 (17 Aug 2020 18:20)  HR: 58 (18 Aug 2020 06:08) (58 - 75)  BP: 123/71 (18 Aug 2020 06:08) (110/65 - 123/71)  BP(mean): --  RR: 17 (18 Aug 2020 06:08) (17 - 18)  SpO2: 96% (18 Aug 2020 06:08) (96% - 99%)    Gen: NAD  Breast: Soft, nontender, not engorged.  Abdomen: Soft, nontender, no distension, firm uterine fundus at umbilicus.  Pelvic: Normal lochia noted  Ext: No calf tenderness    LABS:                        12.0   12.59 )-----------( 279      ( 16 Aug 2020 15:00 )             36.0       [place] status:     Allergies    No Known Allergies    Intolerances      MEDICATIONS  (STANDING):  acetaminophen   Tablet .. 975 milliGRAM(s) Oral <User Schedule>  diphtheria/tetanus/pertussis (acellular) Vaccine (ADAcel) 0.5 milliLiter(s) IntraMuscular once  ibuprofen  Tablet. 600 milliGRAM(s) Oral every 6 hours  prenatal multivitamin 1 Tablet(s) Oral daily  sodium chloride 0.9% lock flush 3 milliLiter(s) IV Push every 8 hours    MEDICATIONS  (PRN):  benzocaine 20%/menthol 0.5% Spray 1 Spray(s) Topical every 6 hours PRN for Perineal discomfort  dibucaine 1% Ointment 1 Application(s) Topical every 6 hours PRN Perineal discomfort  diphenhydrAMINE 25 milliGRAM(s) Oral every 6 hours PRN Pruritus  hydrocortisone 1% Cream 1 Application(s) Topical every 6 hours PRN Moderate Pain (4-6)  lanolin Ointment 1 Application(s) Topical every 6 hours PRN nipple soreness  magnesium hydroxide Suspension 30 milliLiter(s) Oral two times a day PRN Constipation  oxyCODONE    IR 5 milliGRAM(s) Oral every 3 hours PRN Moderate to Severe Pain (4-10)  oxyCODONE    IR 5 milliGRAM(s) Oral once PRN Moderate to Severe Pain (4-10)  pramoxine 1%/zinc 5% Cream 1 Application(s) Topical every 4 hours PRN Moderate Pain (4-6)  simethicone 80 milliGRAM(s) Chew every 4 hours PRN Gas  witch hazel Pads 1 Application(s) Topical every 4 hours PRN Perineal discomfort        Assessment and Plan  PPD #1 s/p   Doing well.  Encourage ambulation.  PP & PPD Instructions reviewed.  Discharge home today  To follow up in the office in 6 weeks for postpartum visit

## 2020-08-19 RX ORDER — ACETAMINOPHEN 325 MG/1
325 TABLET ORAL
Refills: 0 | Status: ACTIVE | COMMUNITY
Start: 2020-08-19

## 2020-08-19 RX ORDER — PROGESTERONE 200 MG/1
CAPSULE ORAL
Refills: 0 | Status: DISCONTINUED | COMMUNITY
End: 2020-08-19

## 2020-08-21 ENCOUNTER — NON-APPOINTMENT (OUTPATIENT)
Age: 30
End: 2020-08-21

## 2023-04-21 ENCOUNTER — INPATIENT (INPATIENT)
Facility: HOSPITAL | Age: 33
LOS: 0 days | Discharge: HOME | DRG: 69 | End: 2023-04-22
Attending: PSYCHIATRY & NEUROLOGY | Admitting: PSYCHIATRY & NEUROLOGY
Payer: MEDICAID

## 2023-04-21 VITALS
DIASTOLIC BLOOD PRESSURE: 68 MMHG | SYSTOLIC BLOOD PRESSURE: 124 MMHG | TEMPERATURE: 98 F | RESPIRATION RATE: 18 BRPM | HEART RATE: 108 BPM | OXYGEN SATURATION: 97 %

## 2023-04-21 DIAGNOSIS — G45.9 TRANSIENT CEREBRAL ISCHEMIC ATTACK, UNSPECIFIED: ICD-10-CM

## 2023-04-21 LAB
ALBUMIN SERPL ELPH-MCNC: 4.4 G/DL — SIGNIFICANT CHANGE UP (ref 3.3–5)
ALP SERPL-CCNC: 64 U/L — SIGNIFICANT CHANGE UP (ref 40–120)
ALT FLD-CCNC: 12 U/L — SIGNIFICANT CHANGE UP (ref 10–45)
AMPHET UR-MCNC: NEGATIVE — SIGNIFICANT CHANGE UP
ANION GAP SERPL CALC-SCNC: 12 MMOL/L — SIGNIFICANT CHANGE UP (ref 5–17)
APTT BLD: 36.6 SEC — HIGH (ref 27.5–35.5)
AST SERPL-CCNC: 14 U/L — SIGNIFICANT CHANGE UP (ref 10–40)
BARBITURATES UR SCN-MCNC: NEGATIVE — SIGNIFICANT CHANGE UP
BASE EXCESS BLDV CALC-SCNC: -0.4 MMOL/L — SIGNIFICANT CHANGE UP (ref -2–3)
BASOPHILS # BLD AUTO: 0.04 K/UL — SIGNIFICANT CHANGE UP (ref 0–0.2)
BASOPHILS NFR BLD AUTO: 0.4 % — SIGNIFICANT CHANGE UP (ref 0–2)
BENZODIAZ UR-MCNC: NEGATIVE — SIGNIFICANT CHANGE UP
BILIRUB SERPL-MCNC: 0.2 MG/DL — SIGNIFICANT CHANGE UP (ref 0.2–1.2)
BUN SERPL-MCNC: 19 MG/DL — SIGNIFICANT CHANGE UP (ref 7–23)
CA-I SERPL-SCNC: 1.18 MMOL/L — SIGNIFICANT CHANGE UP (ref 1.15–1.33)
CALCIUM SERPL-MCNC: 9.4 MG/DL — SIGNIFICANT CHANGE UP (ref 8.4–10.5)
CHLORIDE BLDV-SCNC: 106 MMOL/L — SIGNIFICANT CHANGE UP (ref 96–108)
CHLORIDE SERPL-SCNC: 102 MMOL/L — SIGNIFICANT CHANGE UP (ref 96–108)
CO2 BLDV-SCNC: 26 MMOL/L — SIGNIFICANT CHANGE UP (ref 22–26)
CO2 SERPL-SCNC: 25 MMOL/L — SIGNIFICANT CHANGE UP (ref 22–31)
COCAINE METAB.OTHER UR-MCNC: NEGATIVE — SIGNIFICANT CHANGE UP
CREAT SERPL-MCNC: 0.91 MG/DL — SIGNIFICANT CHANGE UP (ref 0.5–1.3)
CRP SERPL-MCNC: <3 MG/L — SIGNIFICANT CHANGE UP (ref 0–4)
EGFR: 85 ML/MIN/1.73M2 — SIGNIFICANT CHANGE UP
EOSINOPHIL # BLD AUTO: 0.06 K/UL — SIGNIFICANT CHANGE UP (ref 0–0.5)
EOSINOPHIL NFR BLD AUTO: 0.6 % — SIGNIFICANT CHANGE UP (ref 0–6)
GAS PNL BLDV: 138 MMOL/L — SIGNIFICANT CHANGE UP (ref 136–145)
GAS PNL BLDV: SIGNIFICANT CHANGE UP
GLUCOSE BLDV-MCNC: 92 MG/DL — SIGNIFICANT CHANGE UP (ref 70–99)
GLUCOSE SERPL-MCNC: 104 MG/DL — HIGH (ref 70–99)
HCO3 BLDV-SCNC: 25 MMOL/L — SIGNIFICANT CHANGE UP (ref 22–29)
HCT VFR BLD CALC: 43.8 % — SIGNIFICANT CHANGE UP (ref 34.5–45)
HCT VFR BLDA CALC: 39 % — SIGNIFICANT CHANGE UP (ref 34.5–46.5)
HGB BLD CALC-MCNC: 13 G/DL — SIGNIFICANT CHANGE UP (ref 11.7–16.1)
HGB BLD-MCNC: 14.6 G/DL — SIGNIFICANT CHANGE UP (ref 11.5–15.5)
IMM GRANULOCYTES NFR BLD AUTO: 0.3 % — SIGNIFICANT CHANGE UP (ref 0–0.9)
INR BLD: 1.05 RATIO — SIGNIFICANT CHANGE UP (ref 0.88–1.16)
LACTATE BLDV-MCNC: 0.8 MMOL/L — SIGNIFICANT CHANGE UP (ref 0.5–2)
LYMPHOCYTES # BLD AUTO: 2.43 K/UL — SIGNIFICANT CHANGE UP (ref 1–3.3)
LYMPHOCYTES # BLD AUTO: 23.7 % — SIGNIFICANT CHANGE UP (ref 13–44)
MCHC RBC-ENTMCNC: 31 PG — SIGNIFICANT CHANGE UP (ref 27–34)
MCHC RBC-ENTMCNC: 33.3 GM/DL — SIGNIFICANT CHANGE UP (ref 32–36)
MCV RBC AUTO: 93 FL — SIGNIFICANT CHANGE UP (ref 80–100)
METHADONE UR-MCNC: NEGATIVE — SIGNIFICANT CHANGE UP
MONOCYTES # BLD AUTO: 0.6 K/UL — SIGNIFICANT CHANGE UP (ref 0–0.9)
MONOCYTES NFR BLD AUTO: 5.8 % — SIGNIFICANT CHANGE UP (ref 2–14)
NEUTROPHILS # BLD AUTO: 7.1 K/UL — SIGNIFICANT CHANGE UP (ref 1.8–7.4)
NEUTROPHILS NFR BLD AUTO: 69.2 % — SIGNIFICANT CHANGE UP (ref 43–77)
NRBC # BLD: 0 /100 WBCS — SIGNIFICANT CHANGE UP (ref 0–0)
OPIATES UR-MCNC: NEGATIVE — SIGNIFICANT CHANGE UP
OXYCODONE UR-MCNC: NEGATIVE — SIGNIFICANT CHANGE UP
PCO2 BLDV: 42 MMHG — SIGNIFICANT CHANGE UP (ref 39–42)
PCP SPEC-MCNC: SIGNIFICANT CHANGE UP
PCP UR-MCNC: NEGATIVE — SIGNIFICANT CHANGE UP
PH BLDV: 7.38 — SIGNIFICANT CHANGE UP (ref 7.32–7.43)
PLATELET # BLD AUTO: 312 K/UL — SIGNIFICANT CHANGE UP (ref 150–400)
PO2 BLDV: 67 MMHG — HIGH (ref 25–45)
POTASSIUM BLDV-SCNC: 3.7 MMOL/L — SIGNIFICANT CHANGE UP (ref 3.5–5.1)
POTASSIUM SERPL-MCNC: 4 MMOL/L — SIGNIFICANT CHANGE UP (ref 3.5–5.3)
POTASSIUM SERPL-SCNC: 4 MMOL/L — SIGNIFICANT CHANGE UP (ref 3.5–5.3)
PROT SERPL-MCNC: 7.4 G/DL — SIGNIFICANT CHANGE UP (ref 6–8.3)
PROTHROM AB SERPL-ACNC: 12.2 SEC — SIGNIFICANT CHANGE UP (ref 10.5–13.4)
RBC # BLD: 4.71 M/UL — SIGNIFICANT CHANGE UP (ref 3.8–5.2)
RBC # FLD: 13.1 % — SIGNIFICANT CHANGE UP (ref 10.3–14.5)
SAO2 % BLDV: 95.6 % — HIGH (ref 67–88)
SODIUM SERPL-SCNC: 139 MMOL/L — SIGNIFICANT CHANGE UP (ref 135–145)
THC UR QL: POSITIVE
TROPONIN T, HIGH SENSITIVITY RESULT: <6 NG/L — SIGNIFICANT CHANGE UP (ref 0–51)
WBC # BLD: 10.26 K/UL — SIGNIFICANT CHANGE UP (ref 3.8–10.5)
WBC # FLD AUTO: 10.26 K/UL — SIGNIFICANT CHANGE UP (ref 3.8–10.5)

## 2023-04-21 PROCEDURE — 99223 1ST HOSP IP/OBS HIGH 75: CPT

## 2023-04-21 PROCEDURE — 99285 EMERGENCY DEPT VISIT HI MDM: CPT

## 2023-04-21 PROCEDURE — 70498 CT ANGIOGRAPHY NECK: CPT | Mod: 26,MA

## 2023-04-21 PROCEDURE — 70496 CT ANGIOGRAPHY HEAD: CPT | Mod: 26,MA

## 2023-04-21 RX ORDER — ATORVASTATIN CALCIUM 80 MG/1
80 TABLET, FILM COATED ORAL AT BEDTIME
Refills: 0 | Status: DISCONTINUED | OUTPATIENT
Start: 2023-04-21 | End: 2023-04-22

## 2023-04-21 RX ORDER — SODIUM CHLORIDE 9 MG/ML
1000 INJECTION INTRAMUSCULAR; INTRAVENOUS; SUBCUTANEOUS ONCE
Refills: 0 | Status: COMPLETED | OUTPATIENT
Start: 2023-04-21 | End: 2023-04-21

## 2023-04-21 RX ORDER — CLOPIDOGREL BISULFATE 75 MG/1
75 TABLET, FILM COATED ORAL DAILY
Refills: 0 | Status: DISCONTINUED | OUTPATIENT
Start: 2023-04-21 | End: 2023-04-22

## 2023-04-21 RX ORDER — ASPIRIN/CALCIUM CARB/MAGNESIUM 324 MG
81 TABLET ORAL DAILY
Refills: 0 | Status: DISCONTINUED | OUTPATIENT
Start: 2023-04-21 | End: 2023-04-22

## 2023-04-21 RX ORDER — ENOXAPARIN SODIUM 100 MG/ML
40 INJECTION SUBCUTANEOUS EVERY 24 HOURS
Refills: 0 | Status: DISCONTINUED | OUTPATIENT
Start: 2023-04-21 | End: 2023-04-22

## 2023-04-21 RX ADMIN — ENOXAPARIN SODIUM 40 MILLIGRAM(S): 100 INJECTION SUBCUTANEOUS at 22:37

## 2023-04-21 RX ADMIN — SODIUM CHLORIDE 1000 MILLILITER(S): 9 INJECTION INTRAMUSCULAR; INTRAVENOUS; SUBCUTANEOUS at 15:16

## 2023-04-21 RX ADMIN — Medication 81 MILLIGRAM(S): at 19:48

## 2023-04-21 RX ADMIN — ATORVASTATIN CALCIUM 80 MILLIGRAM(S): 80 TABLET, FILM COATED ORAL at 22:38

## 2023-04-21 NOTE — ED PROVIDER NOTE - CLINICAL SUMMARY MEDICAL DECISION MAKING FREE TEXT BOX
Dr. Rodriguez: 32F h/o TIAs in the past presented as a code stroke. LKW 8:15, 6 hrs PTA, as per mother had a few min episode of right facial droop and slurred speech, which has since resolved. Pt denies all other complaints. +smokes tobacco.  On exam pt is well appearing, nad, NIHSS 0, RRR, CTAB, abdo soft/nt/nd, no ataxia.  Code stroke called and pt seen with neuro. CTs show old strokes. Will f/u with neuro re: recs for further work up.   Pt did not meet criteria for tenecteplase given symptoms resolved and non disabling.

## 2023-04-21 NOTE — ED PROVIDER NOTE - OBJECTIVE STATEMENT
32-year-old female prior history of multiple TIAs presenting to emergency department as stroke code after having episode of slurred speech, right-sided facial droop today at 8:15 AM.  Symptoms lasted for a couple minutes according to family and then self resolved.  Patient currently asymptomatic.  Denies fever, headache, vision change, chest pain, shortness of breath, abdominal pain, dysuria.  Patient has no known cancer history or known hematologic abnormality.  Patient does smoke and is currently on birth control.

## 2023-04-21 NOTE — CONSULT NOTE ADULT - ASSESSMENT
HPI: Patient BIA PUENTE is a 32y (1990) wo/man with a PMHx significant for multiple TIAs has presented to the ED due to transient R lower facial droop around 8 AM 4/21/23 (LKW). It lasted for 1.5 minutes and then resolved, associated with some light headedness. Pt's mother has a hx of leukemia and pt herself is a smoker and is on birth control. Pt is not a teneceteplase candidate given that symptoms resolved. Not on AC/AP.     NIH 0  preMRS 0   ABCD 2    No acute findings on CT per Radiology.     Impression: Transient R lower facial droop possibly 2/2 TIA vs. stroke vs. other etiology    Recommenedations:   Imaging:   -F/U CT/CTA  -MRI brain w/o contrast, if no contraindications  -TTE  -Can consider STAT CT head non-contrast for change in neuro exam    Secondary prevention of stroke:  -Permissive HTN up to 220/110 for 24 hours from symptom onset, gradual normotension over 2-3 days  -Continue 81 mg ASA daily   -Atorvastatin 80 mg daily (long-term goal LDL < 70)  -Tight glucose control (long-term goal HgbA1c < 6%)    Misc/additional recs:   -Neuro checks Q4  -Dysphagia screen  -Speech and swallow eval if dysphagia screen fails  -NPO except meds until dysphagia screen passed  -Head of bed > 30 degrees for aspiration prevention and aspiration precautions ordered.  -Fall precautions, aspiration precautions  -Continuous  telemetry to monitor for arrhythmia  -Stroke labwork: HgbA1C, fasting lipid panel, CBC, CMP, coag pannel, troponin  -Baseline EKG  -PT/OT  -DVT Prophylaxis: Lovenox 40 mg Sq daily unless contraindicated in which case SCDs     Case discussed with Dr. Roxanna Howard    under supervision of stroke attending Dr. Gorman

## 2023-04-21 NOTE — H&P ADULT - HISTORY OF PRESENT ILLNESS
HPI: Patient BIA PUENTE is a 32y (1990) woman, pt of Dr. Bueno and sent in by him for work up, with a PMHx significant for multiple "mini strokes", current tobacco since 18 yo (current 6/day, prior pack/per day) +vapes and  daily marijuana smoker, Zippfizz energy drink user, on OCP, not on AC/AP, has presented to the ED as a code stroke due to transient R lower facial droop around 8 AM 4/21/23 (LKW).. It lasted for 1.5 minutes and then resolved, associated with some light headedness. Pt states around that time she stood up and "felt a rush". Denies any changes in smell or taste, no feeling of doom. Pt's mother has a hx of leukemia and pt herself is on birth control and pt has lost about 50 lbs since 05/2023.   Pt states her prior stroke like event was about 2 years ago when she felt like her "face got stuck" and had some trouble getting words out, first event was about 9 years ago - all told to be mini strokes. Pt denies any recent HAs and no FH of strokes. Pt is not a teneceteplase candidate given that symptoms resolved.     NIH 0  preMRS 0

## 2023-04-21 NOTE — ED PROVIDER NOTE - PROGRESS NOTE DETAILS
Leonides Ritchie MD, PGY1  Discussed with neurology who will admit to their service. Discussed with patient and answered all questions.

## 2023-04-21 NOTE — ED PROVIDER NOTE - PHYSICAL EXAMINATION
Gen: WDWN, NAD  HEENT: EOMI, no nasal discharge, mucous membranes moist  CV: RRR, +S1/S2, no M/R/G, 2+ radial pulses b/l  Resp: CTAB, no W/R/R, no accessory muscle use, no increased work of breathing  GI: Abdomen soft non-distended, NTTP  MSK: No open wounds, no bruising, no LE edema. Strength 5/5 bl UE and LE  Neuro: CN II-XII grossly intact. finger to nose intact. negative romberg. no pronator drift. No facial asymmetry. A&Ox4, following commands, moving all four extremities spontaneously  Psych: appropriate mood

## 2023-04-21 NOTE — H&P ADULT - ASSESSMENT
HPI: Patient BIA PUENTE is a 32y (1990) woman, pt of Dr. Bueno and sent in by him for work up, with a PMHx significant for multiple "mini strokes", current tobacco since 16 yo (current 6/day, prior pack/per day) +vapes and  dailymarijuana smoker, Zippfizz energy drink user, on OCP, not on AC/AP, has presented to the ED as a code stroke due to transient R lower facial droop around 8 AM 23 (LKW).. It lasted for 1.5 minutes and then resolved, associated with some light headedness. Pt states around that time she stood up and "felt a rush". Denies any changes in smell or taste, no feeling of doom. Pt's mother has a hx of leukemia and pt herself is on birth control and pt has lost about 50 lbs since 2023.   Pt states her prior stroke like event was about 2 years ago when she felt like her "face got stuck" and had some trouble getting words out, first event was about 9 years ago - all told to be mini strokes. Pt denies any recent HAs and no FH of strokes. Pt is not a teneceteplase candidate given that symptoms resolved.     NIH 0  preMRS 0   ABCD 2    Imaging as above.    Impression: 31 yo woman current tobacco/marijuana user with previous old strokes and current TIA like presentation with above imaging findings of unclear etiology at this time. Could be possibly 2/2  as a complication of current habits, but may need to other processes such as infectious, inflammatory, toxic/metabolic etc.     Recommendations:  Imaging:   -MRI brain w and w/o contrast, if no contraindications  -TTE  -Can consider STAT CT head non-contrast for change in neuro exam    Secondary prevention of stroke:  -Permissive HTN up to 220/110 for 24 hours from symptom onset, gradual normotension over 2-3 days  -81 mg ASA daily and 75 mg plavix for NIHSS < 3 and ABCD score > 4, otherwise only 81 mg ASA daily  -Atorvastatin 80 mg daily (long-term goal LDL < 70)  -Tight glucose control (long-term goal HgbA1c < 6%)    Misc/additional recs:   ESR, CRP  RPR w/ reflex  Drug screen  -Neuro checks Q4  -Dysphagia screen  -Speech and swallow eval if dysphagia screen fails  -NPO except meds until dysphagia screen passed  -Head of bed > 30 degrees for aspiration prevention and aspiration precautions ordered.  -Fall precautions, aspiration precautions  -Continuous  telemetry to monitor for arrhythmia  -Stroke labwork: HgbA1C, fasting lipid panel, CBC, CMP, coag pannel, troponin  -Baseline EKG  -PT/OT  -DVT Prophylaxis: Lovenox 40 mg Sq daily unless contraindicated in which case SCDs     Case discussed with stroke fellow under supervision of stroke attending Dr. Gorman.                       HPI: Patient BIA PUENTE is a 32y (1990) woman, pt of Dr. Bueno and sent in by him for work up, with a PMHx significant for multiple "mini strokes", current tobacco since 16 yo (current 6/day, prior pack/per day) +vapes and  dailymarijuana smoker, Zippfizz energy drink user, on OCP, not on AC/AP, has presented to the ED as a code stroke due to transient R lower facial droop around 8 AM 23 (LKW).. It lasted for 1.5 minutes and then resolved, associated with some light headedness. Pt states around that time she stood up and "felt a rush". Denies any changes in smell or taste, no feeling of doom. Pt's mother has a hx of leukemia and pt herself is on birth control and pt has lost about 50 lbs since 2023.   Pt states her prior stroke like event was about 2 years ago when she felt like her "face got stuck" and had some trouble getting words out, first event was about 9 years ago - all told to be mini strokes. Pt denies any recent HAs and no FH of strokes. Pt is not a teneceteplase candidate given that symptoms resolved.     NIH 0  preMRS 0   ABCD 2    Imaging as above.    Impression: 33 yo woman current daily tobacco/vape/marijuana user with previous old strokes and current TIA like presentation with above imaging findings of unclear etiology at this time. Could be possibly 2/2  as a complication of current habits, but may need to other processes such as infectious, inflammatory, toxic/metabolic etc. CTA also discussed with radiology-not concerning for vasculitis unless hx suggests otherwise.    Recommendations:  Imaging:   -MRI brain w and w/o contrast, if no contraindications  -TTE  -Can consider STAT CT head non-contrast for change in neuro exam    Secondary prevention of stroke:  -Permissive HTN up to 220/110 for 24 hours from symptom onset, gradual normotension over 2-3 days  -81 mg ASA daily and 75 mg plavix for NIHSS < 3 and ABCD score > 4, otherwise only 81 mg ASA daily  -Atorvastatin 80 mg daily (long-term goal LDL < 70)  -Tight glucose control (long-term goal HgbA1c < 6%)    Misc/additional recs:   ESR, CRP  RPR w/ reflex  Drug screen  -Neuro checks Q4  -Dysphagia screen  -Speech and swallow eval if dysphagia screen fails  -NPO except meds until dysphagia screen passed  -Head of bed > 30 degrees for aspiration prevention and aspiration precautions ordered.  -Fall precautions, aspiration precautions  -Continuous  telemetry to monitor for arrhythmia  -Stroke labwork: HgbA1C, fasting lipid panel, CBC, CMP, coag pannel, troponin  -Baseline EKG  -PT/OT  -DVT Prophylaxis: Lovenox 40 mg Sq daily unless contraindicated in which case SCDs     Case discussed with stroke fellow under supervision of stroke attending Dr. Gorman.

## 2023-04-21 NOTE — ED ADULT NURSE NOTE - OBJECTIVE STATEMENT
33 yo female with a PMH of multiple TIAs presents to the ED complaining of a facial droop. Reports she went to bed yesterday at 1245, woke up this morning at 630. Reports she was playing with her kids and when she stood up felt extremely dizzy. Mom was with patient and reports that when patient was smiling this morning at 0800 noticed a facial droop [unknown which side as mother does not remember]. Patient reports she had an episode of aphasia and could not get words out to her mother. Patient self reporting that symptoms lasted about 2 minutes. Upon arrival, code stroke initiated at 1411, patient taken to CT scan. Neuro intact, GELLER 5/5 strength. PERRL. Denies headache, vision changes, chest pain, shortness of breath, abdominal pain, nausea, vomiting, diarrhea, fevers, chills, dysuria, hematuria, recent illness travel or fall.

## 2023-04-21 NOTE — ED ADULT NURSE NOTE - NSIMPLEMENTINTERV_GEN_ALL_ED
Implemented All Fall with Harm Risk Interventions:  [place] to call system. Call bell, personal items and telephone within reach. Instruct patient to call for assistance. Room bathroom lighting operational. Non-slip footwear when patient is off stretcher. Physically safe environment: no spills, clutter or unnecessary equipment. Stretcher in lowest position, wheels locked, appropriate side rails in place. Provide visual cue, wrist band, yellow gown, etc. Monitor gait and stability. Monitor for mental status changes and reorient to person, place, and time. Review medications for side effects contributing to fall risk. Reinforce activity limits and safety measures with patient and family. Provide visual clues: red socks.

## 2023-04-21 NOTE — H&P ADULT - NSHPLABSRESULTS_GEN_ALL_CORE
ACC: [id number] EXAM: CT ANGIO BRAIN STROKE PROTC IC ORDERED BY: ERNESTINE MADDEN    ACC: [id number] EXAM: CT ANGIO NECK STROKE PROTCL IC ORDERED BY: ERNESTINE MADDEN    ACC: [id number] EXAM: CT BRAIN STROKE PROTOCOL ORDERED BY: ERNESTINE MADDEN    PROCEDURE DATE: 04/21/2023        INTERPRETATION: Clinical Indication: Resolved facial droop, mini strokes    5mm axial sections of the brain were obtained from base to vertex, without the intravenous administration of contrast material. Coronal and sagittal computer generated reconstructed views are available.    No prior brain imaging is available for comparison.    Circular and sulcal prominence for the patient's age is identified consistent with volume loss which is nonspecific but may be related to chronic illness or certain medications. There is an old left lentiform nucleus at infarct. There is an old right caudate head infarct. There is no hemorrhage.        After the intravenous power injection of 70 cc of Omnipaque 300 using a bolus alexys timing run, serial thin sections were obtained through the neck from the thoracic inlet through the intracranial circulation centered at the [id number] on a multislice CT scanner reformatted with coronal and sagittal 2 D-MIP projections, including 3 D reconstructions using a separate 3D Vit[employer] software workstation.A total of 70 cc of Omnipaque were intravenously injected. 30 cc were discarded.      The origins of the carotid and vertebral arteries are normal. The carotid bifurcations are normal bilaterally. The left vertebral artery is dominant.      The distal vertebral arteries are well identified as are the posterior-inferior cerebellar arteries bilaterally. The region of the vertebral basilar junction is normal. The basilar artery is normal. The posterior cerebral and superior cerebellar arteries are normal.    Distal cervical, petrous, cavernous and supraclinoid internal carotid arteries. The anterior cerebral arteries, anterior communicating artery and middle cerebral arteries are normal.    There is no evidence of aneurysm, stenosis, or vessel occlusion.      The normal intracranial venous circulation is identified. The right transverse sinus is dominant. The superior sagittal sinus, internal cerebral veins, vein of Santo, straight sinus, transverse sinuses, sigmoid sinuses and internal jugular veins are normal. Cortical veins are normal.    View of the regional soft tissues of the neck are within normal limits for the patient's age.        IMPRESSION: Volume loss for the patient's age suggesting an underlying chronic illness or may be due to medications. 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 No hemorrhage. Normal CTA of the head and neck.

## 2023-04-21 NOTE — H&P ADULT - NSHPPHYSICALEXAM_GEN_ALL_CORE
General - NAD  Cardiovascular - Peripheral pulses palpable, no edema  Eyes -  Fundoscopy not performed due to safety precautions in the setting of the COVID-19 pandemic    Neurologic Exam:  Mental status - Awake, Alert, Oriented to person, place, and time. Speech fluent, repetition and naming intact. Follows simple and complex commands. Attention/concentration, recent and remote memory (including registration and recall), and fund of knowledge intact    Cranial nerves - PERRLA, VFF, EOMI, face sensation (V1-V3) intact b/l, facial strength intact without asymmetry b/l, hearing intact b/l, palate with symmetric elevation, sternocleidomastiod 5/5 strength b/l, tongue midline on protrusion with full lateral movement    Motor - Normal bulk and tone throughout. No pronator drift.  Strength testing  No drift noted in extremities.     Sensation - Light touch/temperature  intact throughout    Coordination - Finger to Nose intact b/l. No tremors appreciated    Gait and station - Normal casual gait. Romberg (-)

## 2023-04-21 NOTE — CONSULT NOTE ADULT - SUBJECTIVE AND OBJECTIVE BOX
Neurology - Consult Note    -  Spectra: [phone] ([facility]), [phone] ([facility])  -    HPI: Patient BIA PUENTE is a 32y (1990) wo/man with a PMHx significant for multiple TIAs has presented to the ED due to transient R lower facial droop around 8 AM 4/21/23 ([facility]). It lasted for 1.5 minutes and then resolved, associated with some light headedness. Pt's mother has a hx of leukemia and pt herself is a smoker and is on birth control. Pt is not a teneceteplase candidate given that symptoms resolved. Not on AC/AP.     NIH 0  preMRS 0       Review of Systems:   CONSTITUTIONAL: No fevers or chills  NEUROLOGICAL: +As stated in HPI above  SKIN: No itching, burning, rashes, or lesions   All other review of systems is negative unless indicated above.    Allergies:  No Known Allergies      PMHx/PSHx/Family Hx: As above, otherwise see below       Social Hx:  No current use of tobacco, alcohol, or illicit drugs    Medications:  MEDICATIONS  (STANDING):    MEDICATIONS  (PRN):      Vitals:  T(C): 36.7 (04-21-23 @ 14:07), Max: 36.7 (04-21-23 @ 14:07)  HR: 108 (04-21-23 @ 14:07) (108 - 108)  BP: 124/68 (04-21-23 @ 14:07) (124/68 - 124/68)  RR: 18 (04-21-23 @ 14:07) (18 - 18)  SpO2: 97% (04-21-23 @ 14:07) (97% - 97%)    Physical Examination:  General - NAD  Cardiovascular - Peripheral pulses palpable, no edema  Eyes -  Fundoscopy not performed due to safety precautions in the setting of the COVID-19 pandemic    Neurologic Exam:  Mental status - Awake, Alert, Oriented to person, place, and time. Speech fluent, repetition and naming intact. Follows simple and complex commands. Attention/concentration, recent and remote memory (including registration and recall), and fund of knowledge intact    Cranial nerves - PERRLA, VFF, EOMI, face sensation (V1-V3) intact b/l, facial strength intact without asymmetry b/l, hearing intact b/l, palate with symmetric elevation, sternocleidomastiod 5/5 strength b/l, tongue midline on protrusion with full lateral movement    Motor - Normal bulk and tone throughout. No pronator drift.  Strength testing  No drift noted in extremities.     Sensation - Light touch/temperature  intact throughout    Coordination - Finger to Nose intact b/l. No tremors appreciated    Gait and station - Normal casual gait. Romberg (-)    Labs:          CAPILLARY BLOOD GLUCOSE      POCT Blood Glucose.: 118 mg/dL (21 Apr 2023 14:12)          CSF:                  Radiology:

## 2023-04-21 NOTE — H&P ADULT - ATTENDING COMMENTS
hx of strokes and TIA. work up as above, possibly 2/2  but need work up to rule out other causes given age and no known prior risk factors though outside MR report suggestive (WM dz, lacunar appearing infarcts). Check stroke risk factors, hypercoag, echo. Exam is unremarkable. service provided 2023.

## 2023-04-21 NOTE — ED ADULT NURSE NOTE - LAST KNOWN WELL DATE/TIME
Attempted to call the pt REGARDING LAB RESULTS.  There was no answer so I left a msg on pt's voicemail for her to return my call at her earliest convenience.  LINCOLN VALENCIA PT.   21-Apr-2023 08:00

## 2023-04-21 NOTE — ED PROVIDER NOTE - ATTENDING CONTRIBUTION TO CARE
Dr. Rodriguez: I have personally performed a face to face bedside history and physical examination of this patient. I have discussed the history, examination, review of systems, assessment and plan of management with the resident. I have reviewed the electronic medical record and amended it to reflect my history, review of systems, physical exam, assessment and plan.    see mdm

## 2023-04-22 ENCOUNTER — TRANSCRIPTION ENCOUNTER (OUTPATIENT)
Age: 33
End: 2023-04-22

## 2023-04-22 VITALS
OXYGEN SATURATION: 99 % | TEMPERATURE: 98 F | SYSTOLIC BLOOD PRESSURE: 113 MMHG | DIASTOLIC BLOOD PRESSURE: 78 MMHG | HEART RATE: 76 BPM | RESPIRATION RATE: 17 BRPM

## 2023-04-22 LAB
A1C WITH ESTIMATED AVERAGE GLUCOSE RESULT: 5.7 % — HIGH (ref 4–5.6)
ANION GAP SERPL CALC-SCNC: 9 MMOL/L — SIGNIFICANT CHANGE UP (ref 5–17)
BUN SERPL-MCNC: 16 MG/DL — SIGNIFICANT CHANGE UP (ref 7–23)
CALCIUM SERPL-MCNC: 8.8 MG/DL — SIGNIFICANT CHANGE UP (ref 8.4–10.5)
CHLORIDE SERPL-SCNC: 109 MMOL/L — HIGH (ref 96–108)
CHOLEST SERPL-MCNC: 178 MG/DL — SIGNIFICANT CHANGE UP
CO2 SERPL-SCNC: 22 MMOL/L — SIGNIFICANT CHANGE UP (ref 22–31)
CREAT SERPL-MCNC: 0.71 MG/DL — SIGNIFICANT CHANGE UP (ref 0.5–1.3)
EGFR: 115 ML/MIN/1.73M2 — SIGNIFICANT CHANGE UP
ERYTHROCYTE [SEDIMENTATION RATE] IN BLOOD: 40 MM/HR — HIGH (ref 0–15)
ESTIMATED AVERAGE GLUCOSE: 117 MG/DL — HIGH (ref 68–114)
GLUCOSE SERPL-MCNC: 128 MG/DL — HIGH (ref 70–99)
HCT VFR BLD CALC: 41.2 % — SIGNIFICANT CHANGE UP (ref 34.5–45)
HCYS SERPL-MCNC: 4 UMOL/L — SIGNIFICANT CHANGE UP
HDLC SERPL-MCNC: 41 MG/DL — LOW
HGB BLD-MCNC: 13.2 G/DL — SIGNIFICANT CHANGE UP (ref 11.5–15.5)
LIPID PNL WITH DIRECT LDL SERPL: 93 MG/DL — SIGNIFICANT CHANGE UP
MCHC RBC-ENTMCNC: 31.1 PG — SIGNIFICANT CHANGE UP (ref 27–34)
MCHC RBC-ENTMCNC: 32 GM/DL — SIGNIFICANT CHANGE UP (ref 32–36)
MCV RBC AUTO: 96.9 FL — SIGNIFICANT CHANGE UP (ref 80–100)
NON HDL CHOLESTEROL: 136 MG/DL — HIGH
NRBC # BLD: 0 /100 WBCS — SIGNIFICANT CHANGE UP (ref 0–0)
PLATELET # BLD AUTO: 277 K/UL — SIGNIFICANT CHANGE UP (ref 150–400)
POTASSIUM SERPL-MCNC: 4.1 MMOL/L — SIGNIFICANT CHANGE UP (ref 3.5–5.3)
POTASSIUM SERPL-SCNC: 4.1 MMOL/L — SIGNIFICANT CHANGE UP (ref 3.5–5.3)
RBC # BLD: 4.25 M/UL — SIGNIFICANT CHANGE UP (ref 3.8–5.2)
RBC # FLD: 13.2 % — SIGNIFICANT CHANGE UP (ref 10.3–14.5)
SODIUM SERPL-SCNC: 140 MMOL/L — SIGNIFICANT CHANGE UP (ref 135–145)
T PALLIDUM AB TITR SER: NEGATIVE — SIGNIFICANT CHANGE UP
TRIGL SERPL-MCNC: 217 MG/DL — HIGH
WBC # BLD: 9.45 K/UL — SIGNIFICANT CHANGE UP (ref 3.8–10.5)
WBC # FLD AUTO: 9.45 K/UL — SIGNIFICANT CHANGE UP (ref 3.8–10.5)

## 2023-04-22 PROCEDURE — 83036 HEMOGLOBIN GLYCOSYLATED A1C: CPT

## 2023-04-22 PROCEDURE — G0452: CPT | Mod: 26

## 2023-04-22 PROCEDURE — 80053 COMPREHEN METABOLIC PANEL: CPT

## 2023-04-22 PROCEDURE — 84132 ASSAY OF SERUM POTASSIUM: CPT

## 2023-04-22 PROCEDURE — 80048 BASIC METABOLIC PNL TOTAL CA: CPT

## 2023-04-22 PROCEDURE — 85610 PROTHROMBIN TIME: CPT

## 2023-04-22 PROCEDURE — 85303 CLOT INHIBIT PROT C ACTIVITY: CPT

## 2023-04-22 PROCEDURE — 80307 DRUG TEST PRSMV CHEM ANLYZR: CPT

## 2023-04-22 PROCEDURE — 85300 ANTITHROMBIN III ACTIVITY: CPT

## 2023-04-22 PROCEDURE — 99285 EMERGENCY DEPT VISIT HI MDM: CPT

## 2023-04-22 PROCEDURE — 82947 ASSAY GLUCOSE BLOOD QUANT: CPT

## 2023-04-22 PROCEDURE — 85025 COMPLETE CBC W/AUTO DIFF WBC: CPT

## 2023-04-22 PROCEDURE — 85307 ASSAY ACTIVATED PROTEIN C: CPT

## 2023-04-22 PROCEDURE — 70553 MRI BRAIN STEM W/O & W/DYE: CPT | Mod: 26

## 2023-04-22 PROCEDURE — 85613 RUSSELL VIPER VENOM DILUTED: CPT

## 2023-04-22 PROCEDURE — 93970 EXTREMITY STUDY: CPT

## 2023-04-22 PROCEDURE — 92523 SPEECH SOUND LANG COMPREHEN: CPT

## 2023-04-22 PROCEDURE — 86146 BETA-2 GLYCOPROTEIN ANTIBODY: CPT

## 2023-04-22 PROCEDURE — 70450 CT HEAD/BRAIN W/O DYE: CPT | Mod: MA

## 2023-04-22 PROCEDURE — 81240 F2 GENE: CPT

## 2023-04-22 PROCEDURE — 83090 ASSAY OF HOMOCYSTEINE: CPT

## 2023-04-22 PROCEDURE — 85652 RBC SED RATE AUTOMATED: CPT

## 2023-04-22 PROCEDURE — 82803 BLOOD GASES ANY COMBINATION: CPT

## 2023-04-22 PROCEDURE — 70498 CT ANGIOGRAPHY NECK: CPT | Mod: MA

## 2023-04-22 PROCEDURE — 36415 COLL VENOUS BLD VENIPUNCTURE: CPT

## 2023-04-22 PROCEDURE — 82962 GLUCOSE BLOOD TEST: CPT

## 2023-04-22 PROCEDURE — 80061 LIPID PANEL: CPT

## 2023-04-22 PROCEDURE — 70496 CT ANGIOGRAPHY HEAD: CPT | Mod: MA

## 2023-04-22 PROCEDURE — 86147 CARDIOLIPIN ANTIBODY EA IG: CPT

## 2023-04-22 PROCEDURE — 93970 EXTREMITY STUDY: CPT | Mod: 26

## 2023-04-22 PROCEDURE — 85027 COMPLETE CBC AUTOMATED: CPT

## 2023-04-22 PROCEDURE — A9585: CPT

## 2023-04-22 PROCEDURE — 97161 PT EVAL LOW COMPLEX 20 MIN: CPT

## 2023-04-22 PROCEDURE — 97165 OT EVAL LOW COMPLEX 30 MIN: CPT

## 2023-04-22 PROCEDURE — 84484 ASSAY OF TROPONIN QUANT: CPT

## 2023-04-22 PROCEDURE — 86140 C-REACTIVE PROTEIN: CPT

## 2023-04-22 PROCEDURE — 82435 ASSAY OF BLOOD CHLORIDE: CPT

## 2023-04-22 PROCEDURE — 85306 CLOT INHIBIT PROT S FREE: CPT

## 2023-04-22 PROCEDURE — 85730 THROMBOPLASTIN TIME PARTIAL: CPT

## 2023-04-22 PROCEDURE — 84295 ASSAY OF SERUM SODIUM: CPT

## 2023-04-22 PROCEDURE — 81241 F5 GENE: CPT

## 2023-04-22 PROCEDURE — 83605 ASSAY OF LACTIC ACID: CPT

## 2023-04-22 PROCEDURE — 85018 HEMOGLOBIN: CPT

## 2023-04-22 PROCEDURE — 86780 TREPONEMA PALLIDUM: CPT

## 2023-04-22 PROCEDURE — 93005 ELECTROCARDIOGRAM TRACING: CPT

## 2023-04-22 PROCEDURE — 70553 MRI BRAIN STEM W/O & W/DYE: CPT | Mod: MA

## 2023-04-22 PROCEDURE — 99238 HOSP IP/OBS DSCHRG MGMT 30/<: CPT

## 2023-04-22 PROCEDURE — 82330 ASSAY OF CALCIUM: CPT

## 2023-04-22 PROCEDURE — 85014 HEMATOCRIT: CPT

## 2023-04-22 RX ORDER — CLOPIDOGREL BISULFATE 75 MG/1
1 TABLET, FILM COATED ORAL
Qty: 30 | Refills: 0
Start: 2023-04-22 | End: 2023-05-21

## 2023-04-22 RX ORDER — ASPIRIN/CALCIUM CARB/MAGNESIUM 324 MG
1 TABLET ORAL
Qty: 30 | Refills: 0
Start: 2023-04-22 | End: 2023-05-21

## 2023-04-22 RX ORDER — ATORVASTATIN CALCIUM 80 MG/1
1 TABLET, FILM COATED ORAL
Qty: 30 | Refills: 0
Start: 2023-04-22 | End: 2023-05-21

## 2023-04-22 RX ORDER — ATORVASTATIN CALCIUM 80 MG/1
40 TABLET, FILM COATED ORAL AT BEDTIME
Refills: 0 | Status: DISCONTINUED | OUTPATIENT
Start: 2023-04-22 | End: 2023-04-22

## 2023-04-22 RX ADMIN — Medication 81 MILLIGRAM(S): at 12:20

## 2023-04-22 RX ADMIN — CLOPIDOGREL BISULFATE 75 MILLIGRAM(S): 75 TABLET, FILM COATED ORAL at 12:20

## 2023-04-22 RX ADMIN — ATORVASTATIN CALCIUM 40 MILLIGRAM(S): 80 TABLET, FILM COATED ORAL at 21:35

## 2023-04-22 RX ADMIN — ENOXAPARIN SODIUM 40 MILLIGRAM(S): 100 INJECTION SUBCUTANEOUS at 21:35

## 2023-04-22 NOTE — SPEECH LANGUAGE PATHOLOGY EVALUATION - SLP PERTINENT HISTORY OF CURRENT PROBLEM
Patient BIA PUENTE is a 32y (1990) woman, pt of Dr. Bueno and sent in by him for work up, with a PMHx significant for multiple "mini strokes", current tobacco since 16 yo (current 6/day, prior pack/per day) +vapes and  daily marijuana smoker, Zippfizz energy drink user, on OCP, not on AC/AP, has presented to the ED as a code stroke due to transient R lower facial droop around 8 AM 4/21/23 ([facility]KW).. It lasted for 1.5 minutes and then resolved, associated with some light headedness. Pt states around that time she stood up and "felt a rush". Denies any changes in smell or taste, no feeling of doom. Pt's mother has a hx of leukemia and pt herself is on birth control and pt has lost about 50 lbs since 05/2023.

## 2023-04-22 NOTE — SPEECH LANGUAGE PATHOLOGY EVALUATION - SLP PATIENT/FAMILY GOALS STATEMENT
Pt wants to return home; denies any current difficulty/ changes in the domains of speech, language, or cognition.

## 2023-04-22 NOTE — DISCHARGE NOTE PROVIDER - NSDCCPCAREPLAN_GEN_ALL_CORE_FT
PRINCIPAL DISCHARGE DIAGNOSIS  Diagnosis: Brain TIA  Assessment and Plan of Treatment: .Please follow up with neurologist in 1 week. Continue taking medications as prescribed. Monitor your blood pressure. Reduce fat, cholesterol and salt in your diet. Increase intake of fruits and vegetables. Limit alcohol to minimum and do not smoke. You may be at risk for falling, make changes to your home to help you walk easier. Keep up to date on vaccinations.  If you experience any symptoms of facial drooping, slurred speech, arm or leg weakness, severe headache, vision changes or any worsening symptoms, notify provider immediatley and return to ER.       PRINCIPAL DISCHARGE DIAGNOSIS  Diagnosis: Brain TIA  Assessment and Plan of Treatment: Please follow up with neurologist in 1 week. Take aspirin and plavix for 21 days, after this, stop plavix and continue with aspirin only.   Continue taking medications as prescribed. Monitor your blood pressure. Reduce fat, cholesterol and salt in your diet. Increase intake of fruits and vegetables. Limit alcohol to minimum and do not smoke. You may be at risk for falling, make changes to your home to help you walk easier. Keep up to date on vaccinations.  If you experience any symptoms of facial drooping, slurred speech, arm or leg weakness, severe headache, vision changes or any worsening symptoms, notify provider immediatley and return to ER.

## 2023-04-22 NOTE — OCCUPATIONAL THERAPY INITIAL EVALUATION ADULT - PERTINENT HX OF CURRENT PROBLEM, REHAB EVAL
33yo F PMHx multiple "mini strokes", +vapes, daily marijuana smoker, energy drink user, on OCP, presented to ED as a code stroke due to transient R lower facial droop around 8 AM 4/21/23 ([facility]) lasted for 1.5 min then resolved, associated with some light headedness. she stood up and "felt a rush". prior stroke like event was about 2yr ago, first event was about 9yr ago - all told to be mini strokes. Pt is not a teneceteplase candidate given that symptoms resolved. NIH 0, preMRS 0     CTH, CTA head/neck 04/21/23: Volume loss for the patient's age suggesting an underlying chronic illness or may be due to medications. 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 No hemorrhage. Normal CTA of the head and neck.

## 2023-04-22 NOTE — PROGRESS NOTE ADULT - NS ATTEND AMEND GEN_ALL_CORE FT
pt declines any further testing and wishes to leave today. Cont DAPT for 21 days then monotherapy with aspirin. Atorvastatin- labs not back yet, to be followed up outpt and dose to be adjusted. Pt will be following up with Dr. Teresa.  Further testing and work up to be completed outpatient. pt declines any further testing and wishes to leave today. Cont DAPT for 21 days then monotherapy with aspirin. Atorvastatin- labs not back yet, to be followed up outpt and dose to be adjusted. Smoking and marijuana cessation counseling provided. Pt will be following up with Dr. Teresa.  Further testing and work up to be completed outpatient.

## 2023-04-22 NOTE — SPEECH LANGUAGE PATHOLOGY EVALUATION - COMMENTS
Pt states her prior stroke like event was about 2 years ago when she felt like her "face got stuck" and had some trouble getting words out, first event was about 9 years ago - all told to be mini strokes. Pt denies any recent HAs and no FH of strokes. Pt is not a teneceteplase candidate given that symptoms resolved.   Mental status - Awake, Alert, Oriented to person, place, and time. Speech fluent, repetition and naming intact. Follows simple and complex commands. Attention/concentration, recent and remote memory (including registration and recall), and fund of knowledge intact  Impression: 31 yo woman current tobacco/marijuana user with previous old strokes and current TIA like presentation with above imaging findings of unclear etiology at this time. Could be possibly 2/2  as a complication of current habits, but may need to other processes such as infectious, inflammatory, toxic/metabolic etc.     Speech/ swallow hx: Pt is new to this service Picture description task - WFL Clock-drawing task WFL

## 2023-04-22 NOTE — PHYSICAL THERAPY INITIAL EVALUATION ADULT - PERTINENT HX OF CURRENT PROBLEM, REHAB EVAL
Patient BIA PUENTE is a 32y (1990) woman, pt of Dr. Bueno and sent in by him for work up, with a PMHx significant for multiple "mini strokes", current tobacco since 16 yo (current 6/day, prior pack/per day) +vapes and  daily marijuana smoker, Zippfizz energy drink user, on OCP, not on AC/AP, has presented to the ED as a code stroke due to transient R lower facial droop around 8 AM 4/21/23 ([facility]).. It lasted for 1.5 minutes and then resolved, associated with some light headedness. Pt states around that time she stood up and "felt a rush". Denies any changes in smell or taste, no feeling of doom. Pt's mother has a hx of leukemia and pt herself is on birth control and pt has lost about 50 lbs since 05/2023.   Pt states her prior stroke like event was about 2 years ago when she felt like her "face got stuck" and had some trouble getting words out, first event was about 9 years ago - all told to be mini strokes. Pt denies any recent HAs and no FH of strokes. Pt is not a teneceteplase candidate given that symptoms resolved. HOSPITAL COURSE: 4/21 CT Head stroke protocol, CT angio neck protocol, and  CT Angio head protocol all reveal: Volume loss for the patient's age suggesting an underlying   chronic illness or may be due to medications. Old left ganglia and corona   radiata infarct. 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 No hemorrhage. Normal   CTA of the head and neck.

## 2023-04-22 NOTE — DISCHARGE NOTE PROVIDER - YES NO FOR MLM POSITIVE OR NEGATIVE COVID RESULT
Physical Therapy     Referred by: Twin Cavazos,*; Medical Diagnosis (from order):    Diagnosis Information      Diagnosis    715.16 (ICD-9-CM) - M17.12 (ICD-10-CM) - Primary osteoarthritis of left knee    719.46, 338.29 (ICD-9-CM) - M25.562, G89.29 (ICD-10-CM) - Chronic pain of left knee                Initial Evaluation    Visit: 1  Treatment Diagnosis: left: knee, symptoms with increased pain/symptoms, impaired posture, impaired range of motion, impaired muscle length/flexibility, impaired mobility, impaired balance, impaired motor function/performance/coordination, impaired strength, increased swelling, impaired gait, impaired activity tolerance, impaired body mechanics  Date of surgery: 1/19/2022  Procedure: total knee replacement - left.     Diagnosis Precautions: None from referring MD per patient report  Patient alert and oriented X3.  Chart reviewed at time of initial evaluation (relevant co-morbidities, allergies, tests and medications listed): Allergies: none  Medications: reviewed as per medical chart.  Patient states he declined Narcan  Co-morbidities: HTN, back pain, high cholesterol, h/o bilateral renal masses  Diagnostic Tests: X-ray: reviewed.      SUBJECTIVE                                                                                                               H/o left knee pain and back.  Knee pain present and worsening over last year.  No therapy before knee surgery.  Pain / Symptoms:  Pain/symptom is: constant  Pain rating (out of 10): Current: 2   Location: Left knee   Quality / Description:. Shin area and quad muscle area - feels like a muscle pull  Alleviating Factors: ice, over-the-counter medication, prescription medications.   Function:   Limitations / Exacerbation Factors: pain, difficulty, increased time, All activities with left lower extremity   Prior Level of Function: worsening pain and function, therefore underwent surgery,  Patient Goals: Get the mobility back again  - retired 2 years ago and enjoys walking and golfing and yard work    Prior treatment: no therapies  Discharged from hospital, home health, or skilled nursing facility in last 30 days: yes  Home Environment:   Patient lives with significant other  Type of home: multiple level home (tri-level home)  Assistance available: consistent  Feels safe at home/work/school.  2 or more falls or an unexplained fall with injury in the last year:  No    OBJECTIVE                                                                                                                     Observed Gait:     Gait with 2 ww with good hip and knee flexion during swing phase noted, heel to to pattern demonstrated, slight lean forward at hips with increased upper extremity use on walker    Dressing intact, dry; compression stockings present; no redness present     Range of Motion (ROM)   (degrees unless noted; active unless noted; norms in ( ); negative=lacking to 0, positive=beyond 0)   Knee:    - Flexion (150):        • Left: 101        • Right: 137    - Extension (0-10):        • Left: -7        • Right: 0    Strength  (out of 5 unless noted, standard test position unless noted, lbs tested with hand held dynamometer)   Hip:    - External Rotation:        • Right: 5  Knee:    - Flexion:        • Right: 5        Patient stated he thought he filled out outcome measure online, however, none found, therefore will issue KOOS next session  TREATMENT                                                                                                                initial evaluation completed    Therapeutic Exercise:  Supine:  Quad sets: 5 sec hold 1 x 10 reps  terminal knee extension: 1 x 10 reps   Heel slides: 1 x 5  Seated knee extension: 1 x 5 with slight extensor lag  Ankle pumps - instructed in performing seated, supine    Instructed in:  Icing/elevation - continue throughout the day  Walking 1x/hour with wheeled walker  signs of a blood clot: pain,  redness, swelling, warmth in calf of back of knee  Use of walker at all times, as patient transferred to/from chair and mat table without use of walker     Skilled input: verbal instruction/cues and as detailed above    Writer verbally educated and received verbal consent for hand placement, positioning of patient, and techniques to be performed today from patient for hand placement and palpation for techniques and therapist position for techniques as described above and how they are pertinent to the patient's plan of care.    Home Exercise Program/Education Materials: Ankle pumps seated, supine  Seated knee extension   Supine heel slides  Supine quad set   Supine terminal knee extension   Above 2x10 to perform 2x/day    Icing off and on throughout the day     ASSESSMENT                                                                                                           68 year old male patient has reported functional limitations listed above impacted by signs and symptoms consistent with below   • Involved:       - left knee   • Symptoms/impairments: increased pain/symptoms, impaired posture, impaired range of motion, impaired muscle length/flexibility, impaired mobility, impaired balance, impaired motor function/performance/coordination, impaired strength, increased swelling, impaired gait, impaired activity tolerance and impaired body mechanics  Patient presents to PT s/p left Total Knee Replacement 2 days ago with very good initial knee flexion measurement at 101 degrees supine.  Minimal pain at 2/10 reported.  Quad set present, but weak.  Patient is very motivated to return to walking and yard work.    Prognosis: patient will benefit from skilled therapy  Rehabilitative potential is: very good  Predicted patient presentation: Low (stable) - Patient comorbidities and complexities, as defined above, will have little effect on progress for prescribed plan of care.  Patient Education:   Who will be receiving  education: patient  Are they ready to learn: yes  Preferred learning style: written, verbal and demonstration  Barriers to learning: no barriers apparent at this time  Results of above outlined education: Verbalizes understanding and Needs reinforcement      PLAN                                                                                                                         The following skilled interventions to be implemented to achieve goals listed below:Activities of Daily Living/Self Care (83304)  Gait Training (07370)  Manual Therapy (55788)  Neuromuscular Re-Education (30875)  Therapeutic Activity (97257)  Therapeutic Exercise (11257)  Electrical Stimulation (unattended, 87229 or )  Heat/Cold (22593)  Ultrasound/Phonophoresis (31274)  Frequency / Duration: 2 times per week tapering as patient progresses for an estimated total of 16 visits for 12 weeks    Patient involved in and agreed to plan of care and goals.  Patient given attendance policy at time of initial evaluation.  Suggestions for next session as indicated: Progress per plan of care, address gait mechanics, progress quad strengthening, add hamstring stretching, add straight leg raises       GOALS                                                                                                                           Decrease pain/symptoms to 1/10  Improve involved strength to 4+/5 left knee  Improve involved ROM to 0-120 degrees left knee  The above improvements in impairments to assist in obtaining goals listed below  Long Term Goals: to be met by end of plan of care  1. Patient will be independent in a progressive HEP  2. Patient will negotiate 8 stairs with 1 rail to negotiate between floors in tri-level home (with reciprocal pattern)  3. Patient will negotiate 6 inch curb step independent with no assistive device to access community  4. Patient will stand x 30 minutes to allow bathing/grooming/dressing and meal prep/washing dishes  5.  Patient will ambulate x 30 minutes to allow grocery shopping/running errands independent/no assistive device      Therapy procedure time and total treatment time can be found documented on the Time Entry flowsheet   ,

## 2023-04-22 NOTE — DISCHARGE NOTE NURSING/CASE MANAGEMENT/SOCIAL WORK - NSDCPEFALRISK_GEN_ALL_CORE
For information on Fall & Injury Prevention, visit: [email or web address] OR  [email or web address] OR  https://www.cdc.gov/steadi/patient.html

## 2023-04-22 NOTE — DISCHARGE NOTE PROVIDER - CARE PROVIDER_API CALL
Solomon Teresa  NEUROLOGY  [address], [address]  [place], [place] [id number]  Phone: [phone]  Fax: [phone]  Follow Up Time:     Chapo Soto (DO)  Cardiology; Internal Medicine  [address] [place] [address], [address]  [place] [address]  Phone: [phone]  Fax: [phone]  Follow Up Time:

## 2023-04-22 NOTE — DISCHARGE NOTE PROVIDER - NSDCMRMEDTOKEN_GEN_ALL_CORE_FT
aspirin 81 mg oral delayed release tablet: 1 tab(s) orally once a day - started on 4/21/23  biotin:   norethindrone 0.35 mg oral tablet: 1 tab(s) orally once a day   biotin:   norethindrone 0.35 mg oral tablet: 1 tab(s) orally once a day   aspirin 81 mg oral delayed release tablet: 1 tab(s) orally once a day - started on 4/21/23  atorvastatin 40 mg oral tablet: 1 tab(s) orally once a day (at bedtime)  biotin:   clopidogrel 75 mg oral tablet: 1 tab(s) orally once a day  norethindrone 0.35 mg oral tablet: 1 tab(s) orally once a day

## 2023-04-22 NOTE — PROGRESS NOTE ADULT - SUBJECTIVE AND OBJECTIVE BOX
THE PATIENT WAS SEEN AND EXAMINED BY ME WITH THE HOUSESTAFF AND STROKE TEAM DURING MORNING ROUNDS.   HPI:  33y/o woman, pt of Dr. Bueno and sent in by him for work up, with a PMHx significant for multiple "mini strokes", current tobacco since 18 yo (current 6/day, prior pack/per day) +vapes and  daily marijuana smoker, [employer] energy drink user, on OCP, not on AC/AP, has presented to the ED as a code stroke due to transient R lower facial droop around 8 AM 4/21/23 ([facility]).. It lasted for 1.5 minutes and then resolved, associated with some light headedness. Pt states around that time she stood up and "felt a rush". Denies any changes in smell or taste, no feeling of doom. Pt's mother has a hx of leukemia and pt herself has lost about 50 lbs since 05/2023.   Pt states her prior stroke like event was about 2 years ago when she felt like her "face got stuck" and had some trouble getting words out, first event was about 9 years ago - all told to be mini strokes. Pt denies any recent HAs and no FH of strokes. Pt is not a teneceteplase candidate given that symptoms resolved. NIH 0, preMRS 0       SUBJECTIVE: No events overnight.  No new neurologic complaints, ROS reported negative unless otherwise noted.      aspirin enteric coated 81 milliGRAM(s) Oral daily  atorvastatin 80 milliGRAM(s) Oral at bedtime  clopidogrel Tablet 75 milliGRAM(s) Oral daily  enoxaparin Injectable 40 milliGRAM(s) SubCutaneous every 24 hours      PHYSICAL EXAM:   Vital Signs Last 24 Hrs  T(C): 36.4 (22 Apr 2023 04:48), Max: 37.2 (22 Apr 2023 00:28)  T(F): 97.6 (22 Apr 2023 04:48), Max: 98.9 (22 Apr 2023 00:28)  HR: 69 (22 Apr 2023 04:48) (67 - 108)  BP: 112/69 (22 Apr 2023 04:48) (103/56 - 129/62)  BP(mean): --  RR: 18 (22 Apr 2023 04:48) (16 - 20)  SpO2: 97% (22 Apr 2023 04:48) (97% - 99%)    Parameters below as of 22 Apr 2023 04:48  Patient On (Oxygen Delivery Method): room air        General: No acute distress  HEENT: EOM intact, visual fields full  Abdomen: Soft, nontender, nondistended   Extremities: No edema    NEUROLOGICAL EXAM:  Mental status: Eyes open, awake, alert, oriented x3, fluent speech, no neglect, able to follow simple commands   Cranial Nerves: No facial asymmetry, no nystagmus, no dysarthria.  Motor exam: Normal tone, no drift, RUE 5/5, RLE 5/5, LUE 5/5, LLE 5/5,.  Sensation: Intact to light touch   Coordination/ Gait: No dysmetria,      LABS:                        13.2   9.45  )-----------( 277      ( 22 Apr 2023 06:48 )             41.2    04-22    140  |  109<H>  |  16  ----------------------------<  128<H>  4.1   |  22  |  0.71    Ca    8.8      22 Apr 2023 06:48    TPro  7.4  /  Alb  4.4  /  TBili  0.2  /  DBili  x   /  AST  14  /  ALT  12  /  AlkPhos  64  04-21  PT/INR - ( 21 Apr 2023 14:22 )   PT: 12.2 sec;   INR: 1.05 ratio         PTT - ( 21 Apr 2023 14:22 )  PTT:36.6 sec      IMAGING: Reviewed by me.     CTH, CTA head/neck 04/21/23: Volume loss for the patient's age suggesting an underlying chronic illness or may be due to medications. 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 No hemorrhage. Normal CTA of the head and neck.

## 2023-04-22 NOTE — DISCHARGE NOTE PROVIDER - HOSPITAL COURSE
32ywoman current tobacco/marijuana user with previous left ganglia and corona radiata infarct and  right caudate head infarct.  Pt presents with c/o transient right lower facial droop. CTH shows no evidence of acute event. Normal CTA of the head and neck     Imaging:    CTH, CTA head/neck 04/21/23: Volume loss for the patient's age suggesting an underlying chronic illness or may be due to medications. 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 No hemorrhage. Normal CTA of the head and neck.    Impression: Transient right lower facial droop: likely TIA vs vascular process, Pending MRI results Prior ischemic infarcts likely small vessel disease    Pt started on ASA/plavix for secondary stroke prevention for 3 weeks followed by ASA only as per [facility] protocol    Plan/goals of care discussed with Pt at bedside, smoke cessation and nicotine patch offered to pt    Pt medically stable for D/C home with no needs as per PT eval workup is completed     32ywoman current tobacco/marijuana user with previous left ganglia and corona radiata infarct and  right caudate head infarct.  Pt presents with c/o transient right lower facial droop. CTH shows no evidence of acute event. Normal CTA of the head and neck     Imaging:    CTH, CTA head/neck 04/21/23: Volume loss for the patient's age suggesting an underlying chronic illness or may be due to medications. 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 No hemorrhage. Normal CTA of the head and neck.    Impression: Transient right lower facial droop: likely TIA vs vascular process, Pending MRI results Prior ischemic infarcts likely small vessel disease    Pt started on ASA/plavix for secondary stroke prevention for 3 weeks followed by ASA only as per [facility] protocol    LE doppler :Negative for DVT  Hypercoagulable panel sent pt to f/u results as outpt    Plan/goals of care discussed with Pt at bedside, smoke cessation and nicotine patch offered to pt    Pt medically stable for D/C home with no needs as per PT eval workup is completed     32ywoman current tobacco/marijuana user with previous left ganglia and corona radiata infarct and  right caudate head infarct.  Pt presents with c/o transient right lower facial droop. CTH shows no evidence of acute event. Normal CTA of the head and neck     Imaging:    CTH, CTA head/neck 04/21/23: Volume loss for the patient's age suggesting an underlying chronic illness or may be due to medications. 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 No hemorrhage. Normal CTA of the head and neck.    Impression: Transient right lower facial droop: likely TIA vs vascular process, Pending MRI results Prior ischemic infarcts likely small vessel disease    Pt started on ASA/plavix for secondary stroke prevention for 3 weeks followed by ASA only as per [facility] protocol    LE doppler :Negative for DVT  Hypercoagulable panel sent pt to f/u results as outpt    Plan/goals of care discussed with Pt at bedside, smoking and marihuana cessation counseling provided to pt. Nicotine patch offered     Pt medically stable for D/C home with no needs as per PT eval. Pt to f/u with Dr Teresa (pt's private neurologist) or further care     32 year old woman current tobacco/marijuana user with previous left ganglia and corona radiata infarct and  right caudate head infarct.  Pt presents with c/o transient right lower facial droop. CTH shows no evidence of acute event. Normal CTA of the head and neck     Imaging:    CTH, CTA head/neck 04/21/23: Volume loss for the patient's age suggesting an underlying chronic illness or may be due to medications. 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 No hemorrhage. Normal CTA of the head and neck.    Impression: Transient right lower facial droop: likely TIA vs vascular process,  MRI performed and preliminarily shows no acute signs of infarct or hemorrhage.   Prior ischemic infarcts likely small vessel disease    Pt started on ASA/plavix for secondary stroke prevention for 3 weeks followed by ASA only as per [facility] protocol    LE doppler :Negative for DVT  Hypercoagulable panel sent pt to f/u results as outpt    Plan/goals of care discussed with Pt at bedside, smoking and marihuana cessation counseling provided to pt. Nicotine patch offered     Pt medically stable for D/C home with no needs as per PT eval. Pt to f/u with Dr Teresa (pt's private neurologist) or further care  All results discussed with patient, all questions and concerns were addressed. Pt deemed stable for discharge with instructions to follow up with Neurologist for official MRI results.

## 2023-04-22 NOTE — SPEECH LANGUAGE PATHOLOGY EVALUATION - SLP DIAGNOSIS
31 yo woman with previous old strokes and current TIA like presentation, pending MRI. Pt presents with intact speech, language, and cognitive-linguistic skills. Able to follow multi-step directives and answer complex questions. Word-finding, abstract reasoning, and insight intact. Speech is intelligible across contexts.

## 2023-04-22 NOTE — PROGRESS NOTE ADULT - ASSESSMENT
ASSESSMENT: This is a 32ywoman with    NEURO: Continue close monitoring for neurologic deterioration, permissive HTN, titrate statin to LDL goal less than 70, MRI Brain w/o, MRA Head w/o and Neck w/contrast. Physical therapy/OT/Speech eval/treatment.     ANTITHROMBOTIC THERAPY: ASA/plavix for secondary stroke prevention    PULMONARY: CXR clear, protecting airway, saturating well     CARDIOVASCULAR: check TTE, cardiac monitoring no events                             SBP goal: 110-180mmhg    GASTROINTESTINAL: Dysphagia screen passed, tolerating diet     Diet: Regular    RENAL: BUN/Cr stable, good urine output, urine toxicology: Positive for THC      Na Goal: Greater than 135     Jung: No    HEMATOLOGY: H/H stable, Platelets normal      DVT ppx:  LMWH     ID: afebrile, no leukocytosis     OTHER:     DISPOSITION: Rehab or home depending on PT eval once stable and workup is complete    CORE MEASURES:        Admission NIHSS:      TPA: [] YES [] NO      LDL/HDL:     Depression Screen:      Statin Therapy:     Dysphagia Screen: [] PASS [] FAIL     Smoking [] YES [] NO      Afib [] YES [] NO     Stroke Education [] YES [] NO    Obtain screening ultrasound in patients who meet 1 or more of the following criteria as patient is high risk for DVT/PE upon admission:   [] History of DVT/PE  []Hypercoagulable states (Factor V Leiden, Cancer, OCP, etc. )  []Prolonged immobility (hemiplegia/hemiparesis/post operative or any other extended immobilization)   [] Transferred from outside facility (Rehab or Long term care)  [] Age </= to 50 ASSESSMENT: This is a 32ywoman current tobacco/marijuana user with previous left ganglia and corona radiata infarct and  right caudate head infarct.  Pt presents with c/o transient right lower facial droop. CTH shows no evidence of acute event. Normal CTA of the head and neck     Impression: Transient right lower facial droop: likely TIA vs vascular process, Pending MRI results Prior ischemic infarcts likely small vessel disease    NEURO: Neuro exam unchanged, Continue close monitoring for neurologic deterioration, permissive HTN with sow titration to normotensive , Pt started on Lipitor 80mg, pending Lipid panel results, titrate statin to LDL goal less than 70, Pending MRI Brain with and w/o contrast. CTH, CTA head/neck results as above, Physical therapy/OT/Speech eval/treatment.     ANTITHROMBOTIC THERAPY: ASA/plavix for secondary stroke prevention    PULMONARY: Protecting airway, saturating well     CARDIOVASCULAR: check TTE, cardiac monitoring no events                             SBP goal: 110-180mmhg    GASTROINTESTINAL: Dysphagia screen passed, tolerating diet     Diet: Regular    RENAL: BUN/Cr stable, good urine output, urine toxicology: Positive for THC      Na Goal: Greater than 135     Jung: No    HEMATOLOGY: H/H stable, Platelets normal, hypercoagulable panel sent LE doppler ordered to r/o DVT       DVT ppx:  LMWH     ID: afebrile, no leukocytosis. ESR 40 CRP <3     OTHER: Plan/goals of care discussed with Pt at bedside, smoke cessation and nicotine patch offered to pt    DISPOSITION: D/C home with no needs as per PT eval once stable and workup is complete    CORE MEASURES:        Admission NIHSS: 0     TPA: NO      LDL/HDL: P     Depression Screen: 0     Statin Therapy: Y     Dysphagia Screen: PASS      SmokingYES       Afib  NO     Stroke Education YES     Obtain screening ultrasound in patients who meet 1 or more of the following criteria as patient is high risk for DVT/PE upon admission:   [] History of DVT/PE  []Hypercoagulable states (Factor V Leiden, Cancer, OCP, etc. )  []Prolonged immobility (hemiplegia/hemiparesis/post operative or any other extended immobilization)   [] Transferred from outside facility (Rehab or Long term care)  [x] Age </= to 50

## 2023-04-24 LAB
AT III ACT/NOR PPP CHRO: 100 % — SIGNIFICANT CHANGE UP (ref 85–135)
B2 GLYCOPROT1 AB SER QL: NEGATIVE — SIGNIFICANT CHANGE UP
CARDIOLIPIN AB SER-ACNC: POSITIVE
DRVVT RATIO: 0.87 RATIO — SIGNIFICANT CHANGE UP (ref 0–1.21)
DRVVT SCREEN TO CONFIRM RATIO: SIGNIFICANT CHANGE UP
PROT C ACT/NOR PPP: 100 % — SIGNIFICANT CHANGE UP (ref 74–150)

## 2023-04-25 LAB
APCR PPP: 2 RATIO — LOW
CARDIOLIPIN IGM SER-MCNC: 63.4 MPL — HIGH (ref 0–12.5)
CARDIOLIPIN IGM SER-MCNC: <5 GPL — SIGNIFICANT CHANGE UP (ref 0–12.5)

## 2023-04-26 LAB — DEPRECATED CARDIOLIPIN IGA SER: <5 APL — SIGNIFICANT CHANGE UP (ref 0–12.5)

## 2023-04-27 LAB
AMPHET UR-MCNC: NEGATIVE NG/ML — SIGNIFICANT CHANGE UP
BARBITURATES UR QL SCN: NEGATIVE NG/ML — SIGNIFICANT CHANGE UP
BARBITURATES UR-MCNC: NEGATIVE NG/ML — SIGNIFICANT CHANGE UP
BENZODIAZ UR-MCNC: NEGATIVE NG/ML — SIGNIFICANT CHANGE UP
COCAINE METAB.OTHER UR-MCNC: NEGATIVE NG/ML — SIGNIFICANT CHANGE UP
CREATININE, URINE THERAPEUTIC: 47.7 MG/DL — SIGNIFICANT CHANGE UP (ref 20–300)
DNA PLOIDY SPEC FC-IMP: SIGNIFICANT CHANGE UP
FENTANYL RESULT, UR: NEGATIVE NG/ML — SIGNIFICANT CHANGE UP
FENTANYL UR QL SCN: NEGATIVE NG/ML — SIGNIFICANT CHANGE UP
Lab: SIGNIFICANT CHANGE UP
METHADONE UR-MCNC: NEGATIVE NG/ML — SIGNIFICANT CHANGE UP
OPIATES UR-MCNC: NEGATIVE NG/ML — SIGNIFICANT CHANGE UP
OXYCODONE UR QL SCN: NEGATIVE NG/ML — SIGNIFICANT CHANGE UP
PCP UR-MCNC: NEGATIVE NG/ML — SIGNIFICANT CHANGE UP
PH, URINE RESULT: 7.3 — SIGNIFICANT CHANGE UP (ref 4.5–8.9)
PROT S FREE PPP-ACNC: 41 % — LOW (ref 63–140)
PTR INTERPRETATION: SIGNIFICANT CHANGE UP
THC UR QL: SIGNIFICANT CHANGE UP NG/ML

## 2024-05-07 NOTE — DISCHARGE NOTE NURSING/CASE MANAGEMENT/SOCIAL WORK - PATIENT PORTAL LINK FT
You can access the FollowMyHealth Patient Portal offered by [facility] by registering at the following website: [email or web address]. By joining [email or web address] FollowMyHealth portal, you will also be able to view your health information using other applications (apps) compatible with our system.
08-May-2024

## 2024-05-17 RX ORDER — IBUPROFEN 200 MG
1 TABLET ORAL
Refills: 0 | DISCHARGE

## 2024-05-17 RX ORDER — ASPIRIN/CALCIUM CARB/MAGNESIUM 324 MG
1 TABLET ORAL
Refills: 0 | DISCHARGE

## 2024-05-17 RX ORDER — NORETHINDRONE 0.35 MG/1
1 TABLET ORAL
Refills: 0 | DISCHARGE